# Patient Record
Sex: MALE | Race: BLACK OR AFRICAN AMERICAN | NOT HISPANIC OR LATINO | Employment: OTHER | URBAN - METROPOLITAN AREA
[De-identification: names, ages, dates, MRNs, and addresses within clinical notes are randomized per-mention and may not be internally consistent; named-entity substitution may affect disease eponyms.]

---

## 2018-05-30 ENCOUNTER — LAB VISIT (OUTPATIENT)
Dept: LAB | Facility: HOSPITAL | Age: 73
End: 2018-05-30
Attending: INTERNAL MEDICINE
Payer: MEDICARE

## 2018-05-30 DIAGNOSIS — I10 ESSENTIAL HYPERTENSION, MALIGNANT: ICD-10-CM

## 2018-05-30 DIAGNOSIS — Z79.899 NEED FOR PROPHYLACTIC CHEMOTHERAPY: ICD-10-CM

## 2018-05-30 DIAGNOSIS — D64.9 ANEMIA, UNSPECIFIED: Primary | ICD-10-CM

## 2018-05-30 PROCEDURE — 36415 COLL VENOUS BLD VENIPUNCTURE: CPT

## 2018-05-30 PROCEDURE — 80177 DRUG SCRN QUAN LEVETIRACETAM: CPT

## 2018-05-30 PROCEDURE — 80053 COMPREHEN METABOLIC PANEL: CPT

## 2018-05-30 PROCEDURE — 85027 COMPLETE CBC AUTOMATED: CPT

## 2018-06-01 LAB
ALBUMIN SERPL BCP-MCNC: 3.8 G/DL
ALP SERPL-CCNC: 74 U/L
ALT SERPL W/O P-5'-P-CCNC: 27 U/L
ANION GAP SERPL CALC-SCNC: 8 MMOL/L
AST SERPL-CCNC: 31 U/L
BILIRUB SERPL-MCNC: 0.6 MG/DL
BUN SERPL-MCNC: 16 MG/DL
CALCIUM SERPL-MCNC: 9.1 MG/DL
CHLORIDE SERPL-SCNC: 105 MMOL/L
CO2 SERPL-SCNC: 26 MMOL/L
CREAT SERPL-MCNC: 1.4 MG/DL
ERYTHROCYTE [DISTWIDTH] IN BLOOD BY AUTOMATED COUNT: 12.1 %
EST. GFR  (AFRICAN AMERICAN): 56.8 ML/MIN/1.73 M^2
EST. GFR  (NON AFRICAN AMERICAN): 49.1 ML/MIN/1.73 M^2
GLUCOSE SERPL-MCNC: 87 MG/DL
HCT VFR BLD AUTO: 38.8 %
HGB BLD-MCNC: 12.8 G/DL
MCH RBC QN AUTO: 29 PG
MCHC RBC AUTO-ENTMCNC: 33 G/DL
MCV RBC AUTO: 88 FL
PLATELET # BLD AUTO: 205 K/UL
PMV BLD AUTO: 10.3 FL
POTASSIUM SERPL-SCNC: 3.6 MMOL/L
PROT SERPL-MCNC: 7.3 G/DL
RBC # BLD AUTO: 4.42 M/UL
SODIUM SERPL-SCNC: 139 MMOL/L
WBC # BLD AUTO: 6.65 K/UL

## 2018-06-04 LAB — LEVETIRACETAM SERPL-MCNC: 11.3 UG/ML (ref 3–60)

## 2018-08-09 ENCOUNTER — LAB VISIT (OUTPATIENT)
Dept: LAB | Facility: HOSPITAL | Age: 73
End: 2018-08-09
Attending: INTERNAL MEDICINE
Payer: MEDICARE

## 2018-08-09 DIAGNOSIS — G40.109 SPECIAL SENSORY ATTACKS: ICD-10-CM

## 2018-08-09 DIAGNOSIS — F10.21 ACUTE ALCOHOLIC INTOXICATION IN ALCOHOLISM, IN REMISSION: ICD-10-CM

## 2018-08-09 DIAGNOSIS — Z86.73 PERSONAL HISTORY OF TRANSIENT CEREBRAL ISCHEMIA: Primary | ICD-10-CM

## 2018-08-09 LAB
ANION GAP SERPL CALC-SCNC: 8 MMOL/L
BUN SERPL-MCNC: 14 MG/DL
CALCIUM SERPL-MCNC: 9.2 MG/DL
CHLORIDE SERPL-SCNC: 109 MMOL/L
CO2 SERPL-SCNC: 26 MMOL/L
CREAT SERPL-MCNC: 1.3 MG/DL
ERYTHROCYTE [DISTWIDTH] IN BLOOD BY AUTOMATED COUNT: 12.5 %
EST. GFR  (AFRICAN AMERICAN): >60 ML/MIN/1.73 M^2
EST. GFR  (NON AFRICAN AMERICAN): 53.8 ML/MIN/1.73 M^2
GLUCOSE SERPL-MCNC: 133 MG/DL
HCT VFR BLD AUTO: 42.9 %
HGB BLD-MCNC: 13.9 G/DL
MCH RBC QN AUTO: 28.9 PG
MCHC RBC AUTO-ENTMCNC: 32.4 G/DL
MCV RBC AUTO: 89 FL
PLATELET # BLD AUTO: 210 K/UL
PMV BLD AUTO: 10.4 FL
POTASSIUM SERPL-SCNC: 3.6 MMOL/L
RBC # BLD AUTO: 4.81 M/UL
SODIUM SERPL-SCNC: 143 MMOL/L
WBC # BLD AUTO: 6.26 K/UL

## 2018-08-09 PROCEDURE — 80048 BASIC METABOLIC PNL TOTAL CA: CPT

## 2018-08-09 PROCEDURE — 85027 COMPLETE CBC AUTOMATED: CPT

## 2018-08-09 PROCEDURE — 80177 DRUG SCRN QUAN LEVETIRACETAM: CPT

## 2018-08-09 PROCEDURE — 36415 COLL VENOUS BLD VENIPUNCTURE: CPT

## 2018-08-13 LAB — LEVETIRACETAM SERPL-MCNC: 8.6 UG/ML (ref 3–60)

## 2018-11-21 ENCOUNTER — LAB VISIT (OUTPATIENT)
Dept: LAB | Facility: HOSPITAL | Age: 73
End: 2018-11-21
Attending: NURSE PRACTITIONER
Payer: MEDICARE

## 2018-11-21 DIAGNOSIS — N39.0 UTI (URINARY TRACT INFECTION): Primary | ICD-10-CM

## 2018-11-21 LAB
BILIRUB UR QL STRIP: NEGATIVE
CLARITY UR: CLEAR
COLOR UR: YELLOW
GLUCOSE UR QL STRIP: NEGATIVE
HGB UR QL STRIP: NEGATIVE
KETONES UR QL STRIP: NEGATIVE
LEUKOCYTE ESTERASE UR QL STRIP: NEGATIVE
NITRITE UR QL STRIP: NEGATIVE
PH UR STRIP: 6 [PH] (ref 5–8)
PROT UR QL STRIP: NEGATIVE
SP GR UR STRIP: 1.01 (ref 1–1.03)
URN SPEC COLLECT METH UR: NORMAL
UROBILINOGEN UR STRIP-ACNC: NEGATIVE EU/DL

## 2018-11-21 PROCEDURE — 81003 URINALYSIS AUTO W/O SCOPE: CPT

## 2018-11-21 PROCEDURE — 87086 URINE CULTURE/COLONY COUNT: CPT

## 2018-11-23 LAB — BACTERIA UR CULT: NORMAL

## 2018-11-29 ENCOUNTER — LAB VISIT (OUTPATIENT)
Dept: LAB | Facility: HOSPITAL | Age: 73
End: 2018-11-29
Attending: INTERNAL MEDICINE
Payer: MEDICARE

## 2018-11-29 DIAGNOSIS — Z86.73 PERSONAL HISTORY OF TRANSIENT CEREBRAL ISCHEMIA: ICD-10-CM

## 2018-11-29 DIAGNOSIS — N19 RENAL FAILURE SYNDROME: ICD-10-CM

## 2018-11-29 DIAGNOSIS — G40.109 SPECIAL SENSORY ATTACKS: Primary | ICD-10-CM

## 2018-11-29 DIAGNOSIS — I10 ESSENTIAL HYPERTENSION, MALIGNANT: ICD-10-CM

## 2018-11-29 LAB
ANION GAP SERPL CALC-SCNC: 10 MMOL/L
BASOPHILS # BLD AUTO: 0.04 K/UL
BASOPHILS NFR BLD: 0.6 %
BUN SERPL-MCNC: 19 MG/DL
CALCIUM SERPL-MCNC: 9.7 MG/DL
CHLORIDE SERPL-SCNC: 105 MMOL/L
CO2 SERPL-SCNC: 27 MMOL/L
CREAT SERPL-MCNC: 1.4 MG/DL
DIFFERENTIAL METHOD: NORMAL
EOSINOPHIL # BLD AUTO: 0.2 K/UL
EOSINOPHIL NFR BLD: 2.8 %
ERYTHROCYTE [DISTWIDTH] IN BLOOD BY AUTOMATED COUNT: 12.2 %
EST. GFR  (AFRICAN AMERICAN): 56.8 ML/MIN/1.73 M^2
EST. GFR  (NON AFRICAN AMERICAN): 49.1 ML/MIN/1.73 M^2
GLUCOSE SERPL-MCNC: 109 MG/DL
HCT VFR BLD AUTO: 43.9 %
HGB BLD-MCNC: 14.5 G/DL
IMM GRANULOCYTES # BLD AUTO: 0.03 K/UL
IMM GRANULOCYTES NFR BLD AUTO: 0.4 %
LYMPHOCYTES # BLD AUTO: 3 K/UL
LYMPHOCYTES NFR BLD: 43.8 %
MCH RBC QN AUTO: 28.8 PG
MCHC RBC AUTO-ENTMCNC: 33 G/DL
MCV RBC AUTO: 87 FL
MONOCYTES # BLD AUTO: 0.7 K/UL
MONOCYTES NFR BLD: 9.6 %
NEUTROPHILS # BLD AUTO: 3 K/UL
NEUTROPHILS NFR BLD: 42.8 %
NRBC BLD-RTO: 0 /100 WBC
PLATELET # BLD AUTO: 213 K/UL
PMV BLD AUTO: 10.4 FL
POTASSIUM SERPL-SCNC: 3.8 MMOL/L
RBC # BLD AUTO: 5.04 M/UL
SODIUM SERPL-SCNC: 142 MMOL/L
WBC # BLD AUTO: 6.9 K/UL

## 2018-11-29 PROCEDURE — 85025 COMPLETE CBC W/AUTO DIFF WBC: CPT

## 2018-11-29 PROCEDURE — 36415 COLL VENOUS BLD VENIPUNCTURE: CPT

## 2018-11-29 PROCEDURE — 80177 DRUG SCRN QUAN LEVETIRACETAM: CPT

## 2018-11-29 PROCEDURE — 80048 BASIC METABOLIC PNL TOTAL CA: CPT

## 2018-12-01 LAB — LEVETIRACETAM SERPL-MCNC: 12.6 UG/ML (ref 3–60)

## 2019-02-07 ENCOUNTER — LAB VISIT (OUTPATIENT)
Dept: LAB | Facility: HOSPITAL | Age: 74
End: 2019-02-07
Attending: INTERNAL MEDICINE
Payer: MEDICARE

## 2019-02-07 DIAGNOSIS — D64.9 ANEMIA, UNSPECIFIED: Primary | ICD-10-CM

## 2019-02-07 DIAGNOSIS — G40.109 SPECIAL SENSORY ATTACKS: ICD-10-CM

## 2019-02-07 DIAGNOSIS — R00.0 TACHYCARDIA, UNSPECIFIED: ICD-10-CM

## 2019-02-07 LAB
ANION GAP SERPL CALC-SCNC: 11 MMOL/L
BASOPHILS # BLD AUTO: 0.04 K/UL
BASOPHILS NFR BLD: 0.5 %
BUN SERPL-MCNC: 15 MG/DL
CALCIUM SERPL-MCNC: 9.2 MG/DL
CHLORIDE SERPL-SCNC: 105 MMOL/L
CO2 SERPL-SCNC: 23 MMOL/L
CREAT SERPL-MCNC: 1.4 MG/DL
DIFFERENTIAL METHOD: ABNORMAL
EOSINOPHIL # BLD AUTO: 0.2 K/UL
EOSINOPHIL NFR BLD: 2.6 %
ERYTHROCYTE [DISTWIDTH] IN BLOOD BY AUTOMATED COUNT: 12.2 %
EST. GFR  (AFRICAN AMERICAN): 56.4 ML/MIN/1.73 M^2
EST. GFR  (NON AFRICAN AMERICAN): 48.8 ML/MIN/1.73 M^2
GLUCOSE SERPL-MCNC: 118 MG/DL
HCT VFR BLD AUTO: 43.1 %
HGB BLD-MCNC: 13.9 G/DL
IMM GRANULOCYTES # BLD AUTO: 0.06 K/UL
IMM GRANULOCYTES NFR BLD AUTO: 0.7 %
LYMPHOCYTES # BLD AUTO: 3.9 K/UL
LYMPHOCYTES NFR BLD: 46.6 %
MCH RBC QN AUTO: 28.4 PG
MCHC RBC AUTO-ENTMCNC: 32.3 G/DL
MCV RBC AUTO: 88 FL
MONOCYTES # BLD AUTO: 0.7 K/UL
MONOCYTES NFR BLD: 8.4 %
NEUTROPHILS # BLD AUTO: 3.4 K/UL
NEUTROPHILS NFR BLD: 41.2 %
NRBC BLD-RTO: 0 /100 WBC
PLATELET # BLD AUTO: 221 K/UL
PMV BLD AUTO: 10.5 FL
POTASSIUM SERPL-SCNC: 3.5 MMOL/L
RBC # BLD AUTO: 4.9 M/UL
SODIUM SERPL-SCNC: 139 MMOL/L
WBC # BLD AUTO: 8.32 K/UL

## 2019-02-07 PROCEDURE — 80048 BASIC METABOLIC PNL TOTAL CA: CPT

## 2019-02-07 PROCEDURE — 36415 COLL VENOUS BLD VENIPUNCTURE: CPT

## 2019-02-07 PROCEDURE — 80177 DRUG SCRN QUAN LEVETIRACETAM: CPT

## 2019-02-07 PROCEDURE — 85025 COMPLETE CBC W/AUTO DIFF WBC: CPT

## 2019-02-11 LAB — LEVETIRACETAM SERPL-MCNC: 9.9 UG/ML (ref 3–60)

## 2019-05-16 ENCOUNTER — LAB VISIT (OUTPATIENT)
Dept: LAB | Facility: HOSPITAL | Age: 74
End: 2019-05-16
Attending: INTERNAL MEDICINE
Payer: MEDICARE

## 2019-05-16 DIAGNOSIS — G40.109 SPECIAL SENSORY ATTACKS: Primary | ICD-10-CM

## 2019-05-16 DIAGNOSIS — D64.9 ANEMIA, UNSPECIFIED: ICD-10-CM

## 2019-05-16 DIAGNOSIS — R00.0 TACHYCARDIA, UNSPECIFIED: ICD-10-CM

## 2019-05-16 LAB
ALBUMIN SERPL BCP-MCNC: 3.9 G/DL (ref 3.5–5.2)
ALP SERPL-CCNC: 75 U/L (ref 55–135)
ALT SERPL W/O P-5'-P-CCNC: 21 U/L (ref 10–44)
ANION GAP SERPL CALC-SCNC: 9 MMOL/L (ref 8–16)
AST SERPL-CCNC: 25 U/L (ref 10–40)
BILIRUB SERPL-MCNC: 0.7 MG/DL (ref 0.1–1)
BUN SERPL-MCNC: 15 MG/DL (ref 8–23)
CALCIUM SERPL-MCNC: 8.8 MG/DL (ref 8.7–10.5)
CHLORIDE SERPL-SCNC: 108 MMOL/L (ref 95–110)
CO2 SERPL-SCNC: 23 MMOL/L (ref 23–29)
CREAT SERPL-MCNC: 1.3 MG/DL (ref 0.5–1.4)
ERYTHROCYTE [DISTWIDTH] IN BLOOD BY AUTOMATED COUNT: 12.3 % (ref 11.5–14.5)
EST. GFR  (AFRICAN AMERICAN): >60 ML/MIN/1.73 M^2
EST. GFR  (NON AFRICAN AMERICAN): 53.4 ML/MIN/1.73 M^2
GLUCOSE SERPL-MCNC: 109 MG/DL (ref 70–110)
HCT VFR BLD AUTO: 42.7 % (ref 40–54)
HGB BLD-MCNC: 13.8 G/DL (ref 14–18)
MCH RBC QN AUTO: 29.2 PG (ref 27–31)
MCHC RBC AUTO-ENTMCNC: 32.3 G/DL (ref 32–36)
MCV RBC AUTO: 90 FL (ref 82–98)
PLATELET # BLD AUTO: 220 K/UL (ref 150–350)
PMV BLD AUTO: 10.3 FL (ref 9.2–12.9)
POTASSIUM SERPL-SCNC: 3.7 MMOL/L (ref 3.5–5.1)
PROT SERPL-MCNC: 7.3 G/DL (ref 6–8.4)
RBC # BLD AUTO: 4.73 M/UL (ref 4.6–6.2)
SODIUM SERPL-SCNC: 140 MMOL/L (ref 136–145)
WBC # BLD AUTO: 8.26 K/UL (ref 3.9–12.7)

## 2019-05-16 PROCEDURE — 36415 COLL VENOUS BLD VENIPUNCTURE: CPT

## 2019-05-16 PROCEDURE — 80053 COMPREHEN METABOLIC PANEL: CPT

## 2019-05-16 PROCEDURE — 80177 DRUG SCRN QUAN LEVETIRACETAM: CPT

## 2019-05-16 PROCEDURE — 85027 COMPLETE CBC AUTOMATED: CPT

## 2019-05-20 LAB — LEVETIRACETAM SERPL-MCNC: 12.1 UG/ML (ref 3–60)

## 2019-08-22 ENCOUNTER — LAB VISIT (OUTPATIENT)
Dept: LAB | Facility: HOSPITAL | Age: 74
End: 2019-08-22
Attending: INTERNAL MEDICINE
Payer: MEDICARE

## 2019-08-22 DIAGNOSIS — G40.109 SPECIAL SENSORY ATTACKS: Primary | ICD-10-CM

## 2019-08-22 DIAGNOSIS — I10 ESSENTIAL HYPERTENSION, MALIGNANT: ICD-10-CM

## 2019-08-22 LAB
ANION GAP SERPL CALC-SCNC: 7 MMOL/L (ref 8–16)
BUN SERPL-MCNC: 12 MG/DL (ref 8–23)
CALCIUM SERPL-MCNC: 9 MG/DL (ref 8.7–10.5)
CHLORIDE SERPL-SCNC: 107 MMOL/L (ref 95–110)
CO2 SERPL-SCNC: 25 MMOL/L (ref 23–29)
CREAT SERPL-MCNC: 1.4 MG/DL (ref 0.5–1.4)
ERYTHROCYTE [DISTWIDTH] IN BLOOD BY AUTOMATED COUNT: 12.4 % (ref 11.5–14.5)
EST. GFR  (AFRICAN AMERICAN): 56.4 ML/MIN/1.73 M^2
EST. GFR  (NON AFRICAN AMERICAN): 48.8 ML/MIN/1.73 M^2
GLUCOSE SERPL-MCNC: 83 MG/DL (ref 70–110)
HCT VFR BLD AUTO: 41 % (ref 40–54)
HGB BLD-MCNC: 13.3 G/DL (ref 14–18)
MCH RBC QN AUTO: 28.9 PG (ref 27–31)
MCHC RBC AUTO-ENTMCNC: 32.4 G/DL (ref 32–36)
MCV RBC AUTO: 89 FL (ref 82–98)
PLATELET # BLD AUTO: 209 K/UL (ref 150–350)
PMV BLD AUTO: 10.2 FL (ref 9.2–12.9)
POTASSIUM SERPL-SCNC: 3.8 MMOL/L (ref 3.5–5.1)
RBC # BLD AUTO: 4.6 M/UL (ref 4.6–6.2)
SODIUM SERPL-SCNC: 139 MMOL/L (ref 136–145)
WBC # BLD AUTO: 6.54 K/UL (ref 3.9–12.7)

## 2019-08-22 PROCEDURE — 36415 COLL VENOUS BLD VENIPUNCTURE: CPT

## 2019-08-22 PROCEDURE — 80048 BASIC METABOLIC PNL TOTAL CA: CPT

## 2019-08-22 PROCEDURE — 80177 DRUG SCRN QUAN LEVETIRACETAM: CPT

## 2019-08-22 PROCEDURE — 85027 COMPLETE CBC AUTOMATED: CPT

## 2019-08-26 LAB — LEVETIRACETAM SERPL-MCNC: 13.3 UG/ML (ref 3–60)

## 2019-11-14 ENCOUNTER — HOSPITAL ENCOUNTER (INPATIENT)
Facility: HOSPITAL | Age: 74
LOS: 2 days | Discharge: NURSING, REHAB OR LONG-TERM CARE | DRG: 101 | End: 2019-11-16
Attending: INTERNAL MEDICINE | Admitting: INTERNAL MEDICINE
Payer: MEDICARE

## 2019-11-14 DIAGNOSIS — R56.9 SEIZURE: Primary | ICD-10-CM

## 2019-11-14 DIAGNOSIS — R41.82 AMS (ALTERED MENTAL STATUS): ICD-10-CM

## 2019-11-14 DIAGNOSIS — G40.909 RECURRENT SEIZURES: ICD-10-CM

## 2019-11-14 PROBLEM — G40.901 STATUS EPILEPTICUS: Status: ACTIVE | Noted: 2019-11-14

## 2019-11-14 LAB
ALLENS TEST: ABNORMAL
ANION GAP SERPL CALC-SCNC: 30 MMOL/L (ref 8–16)
APTT BLDCRRT: 22.1 SEC (ref 21–32)
BASOPHILS # BLD AUTO: ABNORMAL K/UL (ref 0–0.2)
BASOPHILS NFR BLD: 1 % (ref 0–1.9)
BUN SERPL-MCNC: 16 MG/DL (ref 8–23)
CALCIUM SERPL-MCNC: 9.4 MG/DL (ref 8.7–10.5)
CHLORIDE SERPL-SCNC: 107 MMOL/L (ref 95–110)
CK SERPL-CCNC: 482 U/L (ref 20–200)
CO2 SERPL-SCNC: 8 MMOL/L (ref 23–29)
CREAT SERPL-MCNC: 1.9 MG/DL (ref 0.5–1.4)
DELSYS: ABNORMAL
DIFFERENTIAL METHOD: ABNORMAL
EOSINOPHIL # BLD AUTO: ABNORMAL K/UL (ref 0–0.5)
EOSINOPHIL NFR BLD: 3 % (ref 0–8)
ERYTHROCYTE [DISTWIDTH] IN BLOOD BY AUTOMATED COUNT: 12.2 % (ref 11.5–14.5)
EST. GFR  (AFRICAN AMERICAN): 39 ML/MIN/1.73 M^2
EST. GFR  (NON AFRICAN AMERICAN): 33.7 ML/MIN/1.73 M^2
FIO2: 60
GLUCOSE SERPL-MCNC: 190 MG/DL (ref 70–110)
HCO3 UR-SCNC: 17.4 MMOL/L (ref 24–28)
HCT VFR BLD AUTO: 46.1 % (ref 40–54)
HGB BLD-MCNC: 14 G/DL (ref 14–18)
IMM GRANULOCYTES # BLD AUTO: ABNORMAL K/UL (ref 0–0.04)
IMM GRANULOCYTES NFR BLD AUTO: ABNORMAL % (ref 0–0.5)
LYMPHOCYTES # BLD AUTO: ABNORMAL K/UL (ref 1–4.8)
LYMPHOCYTES NFR BLD: 47 % (ref 18–48)
MAGNESIUM SERPL-MCNC: 2.6 MG/DL (ref 1.6–2.6)
MCH RBC QN AUTO: 29.1 PG (ref 27–31)
MCHC RBC AUTO-ENTMCNC: 30.4 G/DL (ref 32–36)
MCV RBC AUTO: 96 FL (ref 82–98)
MODE: ABNORMAL
MONOCYTES # BLD AUTO: ABNORMAL K/UL (ref 0.3–1)
MONOCYTES NFR BLD: 2 % (ref 4–15)
NEUTROPHILS # BLD AUTO: ABNORMAL K/UL (ref 1.8–7.7)
NEUTROPHILS NFR BLD: 43 % (ref 38–73)
NEUTS BAND NFR BLD MANUAL: 4 %
NRBC BLD-RTO: 0 /100 WBC
PCO2 BLDA: 40.8 MMHG (ref 35–45)
PH SMN: 7.24 [PH] (ref 7.35–7.45)
PLATELET # BLD AUTO: 287 K/UL (ref 150–350)
PMV BLD AUTO: 10.3 FL (ref 9.2–12.9)
PO2 BLDA: 220 MMHG (ref 80–100)
POC BE: -10 MMOL/L
POC SATURATED O2: 100 % (ref 95–100)
POC TCO2: 19 MMOL/L (ref 23–27)
POTASSIUM SERPL-SCNC: 3.7 MMOL/L (ref 3.5–5.1)
RBC # BLD AUTO: 4.81 M/UL (ref 4.6–6.2)
SAMPLE: ABNORMAL
SITE: ABNORMAL
SODIUM SERPL-SCNC: 145 MMOL/L (ref 136–145)
WBC # BLD AUTO: 25.25 K/UL (ref 3.9–12.7)

## 2019-11-14 PROCEDURE — 70450 CT HEAD WITHOUT CONTRAST: ICD-10-PCS | Mod: 26,,, | Performed by: RADIOLOGY

## 2019-11-14 PROCEDURE — 20000000 HC ICU ROOM

## 2019-11-14 PROCEDURE — 82803 BLOOD GASES ANY COMBINATION: CPT

## 2019-11-14 PROCEDURE — 71045 XR CHEST AP PORTABLE: ICD-10-PCS | Mod: 26,,, | Performed by: RADIOLOGY

## 2019-11-14 PROCEDURE — 82550 ASSAY OF CK (CPK): CPT

## 2019-11-14 PROCEDURE — 36415 COLL VENOUS BLD VENIPUNCTURE: CPT

## 2019-11-14 PROCEDURE — 85007 BL SMEAR W/DIFF WBC COUNT: CPT

## 2019-11-14 PROCEDURE — 70450 CT HEAD/BRAIN W/O DYE: CPT | Mod: 26,,, | Performed by: RADIOLOGY

## 2019-11-14 PROCEDURE — 85027 COMPLETE CBC AUTOMATED: CPT

## 2019-11-14 PROCEDURE — 80177 DRUG SCRN QUAN LEVETIRACETAM: CPT

## 2019-11-14 PROCEDURE — 63600175 PHARM REV CODE 636 W HCPCS: Performed by: INTERNAL MEDICINE

## 2019-11-14 PROCEDURE — 80048 BASIC METABOLIC PNL TOTAL CA: CPT

## 2019-11-14 PROCEDURE — 36600 WITHDRAWAL OF ARTERIAL BLOOD: CPT

## 2019-11-14 PROCEDURE — 83735 ASSAY OF MAGNESIUM: CPT

## 2019-11-14 PROCEDURE — 94760 N-INVAS EAR/PLS OXIMETRY 1: CPT

## 2019-11-14 PROCEDURE — 70450 CT HEAD/BRAIN W/O DYE: CPT | Mod: TC

## 2019-11-14 PROCEDURE — 71045 X-RAY EXAM CHEST 1 VIEW: CPT | Mod: 26,,, | Performed by: RADIOLOGY

## 2019-11-14 PROCEDURE — 99285 EMERGENCY DEPT VISIT HI MDM: CPT | Mod: 25

## 2019-11-14 PROCEDURE — 96365 THER/PROPH/DIAG IV INF INIT: CPT

## 2019-11-14 PROCEDURE — 85730 THROMBOPLASTIN TIME PARTIAL: CPT

## 2019-11-14 PROCEDURE — 71045 X-RAY EXAM CHEST 1 VIEW: CPT | Mod: TC,FY

## 2019-11-14 PROCEDURE — 25000003 PHARM REV CODE 250: Performed by: INTERNAL MEDICINE

## 2019-11-14 PROCEDURE — S0028 INJECTION, FAMOTIDINE, 20 MG: HCPCS | Performed by: INTERNAL MEDICINE

## 2019-11-14 PROCEDURE — 27000221 HC OXYGEN, UP TO 24 HOURS

## 2019-11-14 PROCEDURE — 99900035 HC TECH TIME PER 15 MIN (STAT)

## 2019-11-14 RX ORDER — AMOXICILLIN 250 MG
1 CAPSULE ORAL 2 TIMES DAILY
Status: DISCONTINUED | OUTPATIENT
Start: 2019-11-14 | End: 2019-11-16 | Stop reason: HOSPADM

## 2019-11-14 RX ORDER — FOLIC ACID 1 MG/1
1 TABLET ORAL DAILY
Status: DISCONTINUED | OUTPATIENT
Start: 2019-11-14 | End: 2019-11-16 | Stop reason: HOSPADM

## 2019-11-14 RX ORDER — SODIUM CHLORIDE 0.9 % (FLUSH) 0.9 %
10 SYRINGE (ML) INJECTION
Status: DISCONTINUED | OUTPATIENT
Start: 2019-11-14 | End: 2019-11-16 | Stop reason: HOSPADM

## 2019-11-14 RX ORDER — OLANZAPINE 5 MG/1
5 TABLET, ORALLY DISINTEGRATING ORAL DAILY
Status: DISCONTINUED | OUTPATIENT
Start: 2019-11-14 | End: 2019-11-16 | Stop reason: HOSPADM

## 2019-11-14 RX ORDER — IRON POLYSACCHARIDE COMPLEX 150 MG
150 CAPSULE ORAL DAILY
Status: DISCONTINUED | OUTPATIENT
Start: 2019-11-14 | End: 2019-11-16 | Stop reason: HOSPADM

## 2019-11-14 RX ORDER — METOPROLOL TARTRATE 25 MG/1
25 TABLET, FILM COATED ORAL 2 TIMES DAILY
Status: DISCONTINUED | OUTPATIENT
Start: 2019-11-14 | End: 2019-11-16 | Stop reason: HOSPADM

## 2019-11-14 RX ORDER — FOLIC ACID 1 MG/1
1 TABLET ORAL DAILY
COMMUNITY

## 2019-11-14 RX ORDER — ONDANSETRON 2 MG/ML
4 INJECTION INTRAMUSCULAR; INTRAVENOUS EVERY 8 HOURS PRN
Status: DISCONTINUED | OUTPATIENT
Start: 2019-11-14 | End: 2019-11-16 | Stop reason: HOSPADM

## 2019-11-14 RX ORDER — AMLODIPINE BESYLATE 2.5 MG/1
5 TABLET ORAL DAILY
Status: DISCONTINUED | OUTPATIENT
Start: 2019-11-14 | End: 2019-11-16 | Stop reason: HOSPADM

## 2019-11-14 RX ORDER — FAMOTIDINE 10 MG/ML
20 INJECTION INTRAVENOUS EVERY 12 HOURS
Status: DISCONTINUED | OUTPATIENT
Start: 2019-11-14 | End: 2019-11-16 | Stop reason: HOSPADM

## 2019-11-14 RX ORDER — AMOXICILLIN 250 MG
1 CAPSULE ORAL DAILY
Status: DISCONTINUED | OUTPATIENT
Start: 2019-11-14 | End: 2019-11-16 | Stop reason: HOSPADM

## 2019-11-14 RX ORDER — THIAMINE HCL 100 MG
100 TABLET ORAL DAILY
Status: DISCONTINUED | OUTPATIENT
Start: 2019-11-14 | End: 2019-11-16 | Stop reason: HOSPADM

## 2019-11-14 RX ORDER — LEVETIRACETAM 250 MG/1
500 TABLET ORAL 2 TIMES DAILY
Status: DISCONTINUED | OUTPATIENT
Start: 2019-11-14 | End: 2019-11-16 | Stop reason: HOSPADM

## 2019-11-14 RX ORDER — ACETAMINOPHEN 325 MG/1
650 TABLET ORAL EVERY 6 HOURS PRN
Status: DISCONTINUED | OUTPATIENT
Start: 2019-11-14 | End: 2019-11-16 | Stop reason: HOSPADM

## 2019-11-14 RX ORDER — OLANZAPINE 5 MG/1
10 TABLET, ORALLY DISINTEGRATING ORAL NIGHTLY
Status: DISCONTINUED | OUTPATIENT
Start: 2019-11-14 | End: 2019-11-16 | Stop reason: HOSPADM

## 2019-11-14 RX ORDER — ADHESIVE BANDAGE
20 BANDAGE TOPICAL DAILY PRN
Status: DISCONTINUED | OUTPATIENT
Start: 2019-11-14 | End: 2019-11-16 | Stop reason: HOSPADM

## 2019-11-14 RX ORDER — LACOSAMIDE 200 MG/1
200 TABLET ORAL 2 TIMES DAILY
Status: DISCONTINUED | OUTPATIENT
Start: 2019-11-14 | End: 2019-11-16 | Stop reason: HOSPADM

## 2019-11-14 RX ORDER — MAG HYDROX/ALUMINUM HYD/SIMETH 200-200-20
30 SUSPENSION, ORAL (FINAL DOSE FORM) ORAL
Status: DISCONTINUED | OUTPATIENT
Start: 2019-11-14 | End: 2019-11-16 | Stop reason: HOSPADM

## 2019-11-14 RX ADMIN — SODIUM CHLORIDE 1000 ML: 0.9 INJECTION, SOLUTION INTRAVENOUS at 10:11

## 2019-11-14 RX ADMIN — DEXTROSE MONOHYDRATE 1500 MG: 50 INJECTION, SOLUTION INTRAVENOUS at 11:11

## 2019-11-14 RX ADMIN — SENNOSIDES AND DOCUSATE SODIUM 1 TABLET: 8.6; 5 TABLET ORAL at 09:11

## 2019-11-14 RX ADMIN — OLANZAPINE 10 MG: 5 TABLET, ORALLY DISINTEGRATING ORAL at 09:11

## 2019-11-14 RX ADMIN — FAMOTIDINE 20 MG: 10 INJECTION INTRAVENOUS at 09:11

## 2019-11-14 RX ADMIN — METOPROLOL TARTRATE 25 MG: 25 TABLET ORAL at 09:11

## 2019-11-14 NOTE — PLAN OF CARE
PLAN OF CARE DISCUSSED VIA PHONE WITH PT'S DAUGHTER, HECTOR LAMBERT, WHO LIVES IN [place]. QUESTIONS ENCOURAGED AND ANSWERED. VOICED UNDERSTANDING.

## 2019-11-14 NOTE — PLAN OF CARE
11/14/19 1629   Discharge Assessment   Assessment Type Discharge Planning Assessment   Confirmed/corrected address and phone number on facesheet? Yes   Assessment information obtained from?   ([facility] ADMISSIONS  DIRECTOR)   Prior to hospitilization cognitive status: Not Oriented to Person;Not Oriented to Time   Prior to hospitalization functional status: Wheelchair Bound   Current cognitive status: Coma/Sedated/Intubated   Current Functional Status: Completely Dependent   Facility Arrived From: [facility]   Lives With facility resident   Able to Return to Prior Arrangements other (see comments)  (UNABLE TO DETERMINE AT THIS TIME.)   Is patient able to care for self after discharge? Unable to determine at this time (comments)   Who are your caregiver(s) and their phone number(s)? [facility] STAFF [phone]   Patient's perception of discharge disposition other (comments)  (PT UNRESPONSIVE)   Readmission Within the Last 30 Days no previous admission in last 30 days   Patient currently being followed by outpatient case management? No   Patient currently receives any other outside agency services? No   Equipment Currently Used at Home other (see comments)  (ANY DME PT HAS NEEDED HAS BEEN PROVIDED BY [facility])   Do you have any problems affording any of your prescribed medications? No   Is the patient taking medications as prescribed? yes   Dialysis Name and Scheduled days N/A   Does the patient receive services at the [facility]? 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 DR BISHOP IS PT'S PCP. PT HAS A DAUGHTER IN [place] WITH A [facility] PHONE PREFIXI, HECTOR LAMBERT, [phone] WHO HAS CALLED ABOUT PT AND GIVEN CONSENT FOR HIS IMM TO BE SIGNED BY  IN PT'S BEHALF. [facility] SAYS PT IS WHEELCHAIR BOUND AND HAS A SEIZURE DISORDER AND DEMENTIA. HE HAD EXTENDED  SEIZURE ACTIVITY PRIOR TO ADMISSION HERE. SW WILL FOLLOW AND ASSIST AS NEEDED.

## 2019-11-14 NOTE — ED PROVIDER NOTES
Encounter Date: 11/14/2019       History     Chief Complaint   Patient presents with    Seizures     Patient is brought from the nursing home with status epilepticus.  The word is that he had seizures for approximately 20 min at the nursing home.  He has a seizure disorder and they were evidently tried to let the seizure run its course.  When he continued to seize [facility] was called.  Took them approximately 20 min to get to the nursing home and then in her 20-30 minutes to get here.  In all the patient had probably 90 min of seizure activity.  On arrival he was seizing and had Ativan.  He abruptly quit seizing and was observed closely.  Intubation was not necessary.    Patient has a history of seizure disorder on 2 medications does have been given according to the nursing home regularly.    Patient also has a history of stroke and dementia.  According the records he is a full code.    On arrival patient had still has some minor seizure activity his eyes were deviated to the left.  He had no spontaneous movement no response to pain. Same after he had Ativan.        Review of patient's allergies indicates:   Allergen Reactions    Ace inhibitors     Bactrim [sulfamethoxazole-trimethoprim] Rash     Past Medical History:   Diagnosis Date    CVA (cerebral infarction)     Hypertension     Non-compliance     Seizure disorder      History reviewed. No pertinent surgical history.  History reviewed. No pertinent family history.  Social History     Tobacco Use    Smoking status: Current Every Day Smoker     Packs/day: 0.50     Types: Cigarettes   Substance Use Topics    Alcohol use: Yes     Alcohol/week: 4.2 standard drinks     Types: 5 drink(s) per week    Drug use: No     Review of Systems   Constitutional: Negative for fever.   HENT: Negative for sore throat.    Respiratory: Negative for shortness of breath.    Cardiovascular: Negative for chest pain.   Gastrointestinal: Negative for nausea.   Genitourinary: Negative  for dysuria.   Musculoskeletal: Negative for back pain.   Skin: Negative for rash.   Neurological: Positive for seizures. Negative for weakness.   Hematological: Does not bruise/bleed easily.       Physical Exam     Initial Vitals [11/14/19 0927]   BP Pulse Resp Temp SpO2   114/64 100 (!) 36 98.2 °F (36.8 °C) 100 %      MAP       --         Physical Exam    Nursing note and vitals reviewed.  Constitutional: Vital signs are normal. He appears well-developed and well-nourished. He is active and cooperative.   HENT:   Head: Normocephalic and atraumatic.   Right Ear: External ear normal.   Left Ear: External ear normal.   Nose: Nose normal.   Mouth/Throat: Oropharynx is clear and moist.   Eyes: Conjunctivae and lids are normal. Pupils are equal, round, and reactive to light. Lids are everted and swept, no foreign bodies found.   Neck: Trachea normal, normal range of motion and full passive range of motion without pain. Neck supple.   Cardiovascular: Normal rate, regular rhythm, S1 normal, S2 normal, normal heart sounds, intact distal pulses and normal pulses.  No extrasystoles are present.    Abdominal: Soft. Normal appearance and bowel sounds are normal.   Neurological: GCS eye subscore is 4. GCS verbal subscore is 5. GCS motor subscore is 6.   Patient with tonic clonic seizures eyes deviated to the right.   Skin: Skin is warm, dry and intact. Capillary refill takes less than 2 seconds.   Psychiatric: His speech is normal. Cognition and memory are normal.         ED Course   Procedures  Labs Reviewed   CBC W/ AUTO DIFFERENTIAL - Abnormal; Notable for the following components:       Result Value    WBC 25.25 (*)     Mean Corpuscular Hemoglobin Conc 30.4 (*)     Mono% 2.0 (*)     All other components within normal limits   BASIC METABOLIC PANEL - Abnormal; Notable for the following components:    CO2 8 (*)     Glucose 190 (*)     Creatinine 1.9 (*)     Anion Gap 30 (*)     eGFR if  39.0 (*)     eGFR if  non  33.7 (*)     All other components within normal limits    Narrative:     CO2   critical result(s) called and verbal readback obtained from   TITI Ramos by DONNA 11/14/2019 10:43  Recoll. [id number] by DONNA at 11/14/2019 09:56, reason: HEMOLYZED   ISTAT PROCEDURE - Abnormal; Notable for the following components:    POC PH 7.237 (*)     POC PO2 220 (*)     POC HCO3 17.4 (*)     POC TCO2 19 (*)     All other components within normal limits   MAGNESIUM    Narrative:     Recoll. [id number] by DONNA at 11/14/2019 09:56, reason: HEMOLYZED   APTT   LEVETIRACETAM  (KEPPRA) LEVEL   CK   LEVETIRACETAM  (KEPPRA) LEVEL     EKG Readings: (Independently Interpreted)   Initial Reading: No STEMI. Rhythm: Normal Sinus Rhythm. Conduction: RBBB. ST Segments: Normal ST Segments. T Waves: Normal. Axis: Left Axis Deviation. Clinical Impression: Normal Sinus Rhythm with RBBB       Imaging Results          CT Head Without Contrast (Final result)  Result time 11/14/19 11:31:01    Final result by Berto Salazar MD (11/14/19 11:31:01)                 Impression:      1. Cortical atrophy with periventricular deep white matter change consistent with chronic small vessel ischemic disease.  2. Small remote lacunar infarcts of the bilateral basal ganglia.  3. Chronic small focus of left cerebellar encephalomalacia.      Electronically signed by: Berto Salazar  Date:    11/14/2019  Time:    11:31             Narrative:    EXAMINATION:  CT HEAD WITHOUT CONTRAST    CLINICAL HISTORY:  Seizures new or progressive;    TECHNIQUE:  Low dose axial images were obtained through the head.  Coronal and sagittal reformations were also performed. Contrast was not administered.    COMPARISON:  MRI 04/29/2016.  CT 04/29/2016.    FINDINGS:  There is no acute hemorrhage or infarction.  There is cortical atrophy.  There are periventricular deep white matter changes consistent with chronic small vessel ischemic disease.  Small remote lacunar  infarcts of the bilateral basal ganglia.  Chronic small focus of left cerebellar encephalomalacia from remote infarction.    No extra-axial fluid collections.  Ventricles are normal in size, shape and configuration.  The basal cisterns are patent.    The imaged paranasal sinuses and ethmoid air cells are well aerated.    The mastoid air cells and middle ears are normally pneumatized.                               X-Ray Chest AP Portable (Final result)  Result time 11/14/19 11:12:43    Final result by Berto Salazar MD (11/14/19 11:12:43)                 Impression:      1. Mild pulmonary hypoinflation.  2. Findings suspicious for mild congestive heart failure.      Electronically signed by: Berto Salazar  Date:    11/14/2019  Time:    11:12             Narrative:    EXAMINATION:  XR CHEST AP PORTABLE    CLINICAL HISTORY:  Altered mental status, unspecified    TECHNIQUE:  Single frontal view of the chest was performed.    COMPARISON:  04/30/2016    FINDINGS:  Mild pulmonary hypoinflation.  Mild diffuse prominence of the pulmonary interstitium most predominant within the perihilar lungs suspicious for mild interstitial edema.  No significant pleural effusion.    Heart size is mildly enlarged.  Calcified aortic plaque.  Trachea midline.  Mediastinal contours unremarkable.    Bony thorax intact.                              X-Rays:   Independently Interpreted Readings:   Chest X-Ray: Normal heart size.  No infiltrates.  No acute abnormalities.     Medical Decision Making:   Clinical Tests:   Lab Tests: Ordered and Reviewed  The following lab test(s) were unremarkable: CBC and CMP       <> Summary of Lab: White count was 25,000.  This probably from the seizure activity.  His creatinine slightly elevated 1.9.  This is new compared is all studies.  Otherwise labs are relatively unremarkable.  Radiological Study: Ordered and Reviewed  Medical Tests: Ordered and Reviewed  ED Management:  CT of the brain showed no acute  findings.  He has marked atrophy and and ventricular dilatation.  Chest x-ray was unremarkable.    Patient was given Ativan.  He he quit having seizures and was observed for several hours.  He had ABGs which showed good oxygenation but metabolic acidosis again from the seizures.    Patient had no further seizure activity was stable he had a mild episode of hypertension which corrected with fluid replacement.  The hospitalist was consulted patient is to be observed, have Keppra levels, be given additional anti epileptic medication observed complications.                                 Clinical Impression:       ICD-10-CM ICD-9-CM   1. Seizure R56.9 780.39   2. AMS (altered mental status) R41.82 780.97   3. Recurrent seizures G40.909 345.80         Disposition:   Disposition: Discharged  Condition: Stable                     Blake Breaux MD  11/14/19 9345

## 2019-11-14 NOTE — NURSING
Pt received from ER via stretcher. Unable to obtain medical surgical history from patient, as he is currently postictal. Pt placed on bedside monitor. Will monitor for seizure activity. Bed alarm in use.

## 2019-11-14 NOTE — PLAN OF CARE
11/14/19 1626   Medicare Message   Important Message from Medicare regarding Discharge Appeal Rights Given to patient/caregiver;Explained to patient/caregiver;Signed/date by patient/caregiver   Date IMM was signed 11/14/19   Time IMM was signed 9018   PT'S DAUGHTER, HECTOR LAMBERT CALLED ABOUT PT AND GAVE VERBA CONSENT OVER PHONE FOR SW TO SIGN IMM FOR PT; WITNESSED BY MARCELO FORBES RN

## 2019-11-15 PROBLEM — R56.9 SEIZURE: Status: ACTIVE | Noted: 2019-11-15

## 2019-11-15 LAB
ALLENS TEST: ABNORMAL
ANION GAP SERPL CALC-SCNC: 8 MMOL/L (ref 8–16)
BUN SERPL-MCNC: 20 MG/DL (ref 8–23)
CALCIUM SERPL-MCNC: 9.3 MG/DL (ref 8.7–10.5)
CHLORIDE SERPL-SCNC: 111 MMOL/L (ref 95–110)
CO2 SERPL-SCNC: 24 MMOL/L (ref 23–29)
CREAT SERPL-MCNC: 1.5 MG/DL (ref 0.5–1.4)
DELSYS: ABNORMAL
EST. GFR  (AFRICAN AMERICAN): 51.9 ML/MIN/1.73 M^2
EST. GFR  (NON AFRICAN AMERICAN): 44.9 ML/MIN/1.73 M^2
GLUCOSE SERPL-MCNC: 99 MG/DL (ref 70–110)
HCO3 UR-SCNC: 23.9 MMOL/L (ref 24–28)
PCO2 BLDA: 41.9 MMHG (ref 35–45)
PH SMN: 7.36 [PH] (ref 7.35–7.45)
PO2 BLDA: 95 MMHG (ref 80–100)
POC BE: -2 MMOL/L
POC SATURATED O2: 97 % (ref 95–100)
POC TCO2: 25 MMOL/L (ref 23–27)
POTASSIUM SERPL-SCNC: 4.1 MMOL/L (ref 3.5–5.1)
SAMPLE: ABNORMAL
SITE: ABNORMAL
SODIUM SERPL-SCNC: 143 MMOL/L (ref 136–145)

## 2019-11-15 PROCEDURE — 27000221 HC OXYGEN, UP TO 24 HOURS

## 2019-11-15 PROCEDURE — 11000001 HC ACUTE MED/SURG PRIVATE ROOM

## 2019-11-15 PROCEDURE — 80048 BASIC METABOLIC PNL TOTAL CA: CPT

## 2019-11-15 PROCEDURE — 82803 BLOOD GASES ANY COMBINATION: CPT

## 2019-11-15 PROCEDURE — 94761 N-INVAS EAR/PLS OXIMETRY MLT: CPT

## 2019-11-15 PROCEDURE — 25000003 PHARM REV CODE 250: Performed by: INTERNAL MEDICINE

## 2019-11-15 PROCEDURE — S0028 INJECTION, FAMOTIDINE, 20 MG: HCPCS | Performed by: INTERNAL MEDICINE

## 2019-11-15 PROCEDURE — 99900035 HC TECH TIME PER 15 MIN (STAT)

## 2019-11-15 PROCEDURE — 36415 COLL VENOUS BLD VENIPUNCTURE: CPT

## 2019-11-15 PROCEDURE — 25000003 PHARM REV CODE 250

## 2019-11-15 PROCEDURE — 36600 WITHDRAWAL OF ARTERIAL BLOOD: CPT

## 2019-11-15 RX ORDER — CLONIDINE HYDROCHLORIDE 0.1 MG/1
TABLET ORAL
Status: COMPLETED
Start: 2019-11-15 | End: 2019-11-15

## 2019-11-15 RX ORDER — METOPROLOL TARTRATE 25 MG/1
50 TABLET, FILM COATED ORAL 2 TIMES DAILY
Status: CANCELLED | OUTPATIENT
Start: 2019-11-15

## 2019-11-15 RX ORDER — CLONIDINE HYDROCHLORIDE 0.1 MG/1
0.2 TABLET ORAL ONCE
Status: COMPLETED | OUTPATIENT
Start: 2019-11-15 | End: 2019-11-15

## 2019-11-15 RX ADMIN — LEVETIRACETAM 500 MG: 250 TABLET ORAL at 09:11

## 2019-11-15 RX ADMIN — CLONIDINE HYDROCHLORIDE 0.2 MG: 0.1 TABLET ORAL at 04:11

## 2019-11-15 RX ADMIN — OLANZAPINE 5 MG: 5 TABLET, ORALLY DISINTEGRATING ORAL at 09:11

## 2019-11-15 RX ADMIN — Medication 100 MG: at 10:11

## 2019-11-15 RX ADMIN — FOLIC ACID 1 MG: 1 TABLET ORAL at 09:11

## 2019-11-15 RX ADMIN — AMLODIPINE BESYLATE 5 MG: 2.5 TABLET ORAL at 09:11

## 2019-11-15 RX ADMIN — SENNOSIDES AND DOCUSATE SODIUM 1 TABLET: 8.6; 5 TABLET ORAL at 09:11

## 2019-11-15 RX ADMIN — OLANZAPINE 10 MG: 5 TABLET, ORALLY DISINTEGRATING ORAL at 09:11

## 2019-11-15 RX ADMIN — METOPROLOL TARTRATE 25 MG: 25 TABLET ORAL at 09:11

## 2019-11-15 RX ADMIN — FAMOTIDINE 20 MG: 10 INJECTION INTRAVENOUS at 09:11

## 2019-11-15 NOTE — SUBJECTIVE & OBJECTIVE
Past Medical History:   Diagnosis Date    CVA (cerebral infarction)     Hypertension     Non-compliance     Seizure disorder        History reviewed. No pertinent surgical history.    Review of patient's allergies indicates:   Allergen Reactions    Ace inhibitors     Bactrim [sulfamethoxazole-trimethoprim] Rash       No current facility-administered medications on file prior to encounter.      Current Outpatient Medications on File Prior to Encounter   Medication Sig    acetaminophen (TYLENOL) 325 MG tablet Take 650 mg by mouth every 6 (six) hours as needed for Pain.    aluminum-magnesium hydroxide-simethicone (MAALOX) 200-200-20 mg/5 mL Susp Take 30 mLs by mouth as needed (for indigestion).    amlodipine (NORVASC) 5 MG tablet Take 5 mg by mouth once daily.     folic acid (FOLVITE) 1 MG tablet Take 1 mg by mouth once daily.    iron polysaccharides (NIFEREX) 150 mg iron Cap Take 1 capsule (150 mg total) by mouth once daily.    lacosamide (VIMPAT) 200 mg Tab tablet Take 1 tablet (200 mg total) by mouth 2 (two) times daily.    levetiracetam (KEPPRA) 500 MG Tab Take 1 tablet (500 mg total) by mouth 2 (two) times daily.    magnesium hydroxide 400 mg/5 ml (MILK OF MAGNESIA) 400 mg/5 mL Susp Take 20 mLs by mouth daily as needed (for constipation).    metoprolol tartrate (LOPRESSOR) 25 MG tablet Take 25 mg by mouth 2 (two) times daily.    multivitamin (THERAGRAN) tablet Take 1 tablet by mouth once daily.    olanzapine (ZYPREXA) 5 MG tablet Take 5 mg by mouth once daily.    olanzapine (ZYPREXA) 7.5 MG tablet Take 7.5 mg by mouth every evening.    senna-docusate 8.6-50 mg (PERICOLACE) 8.6-50 mg per tablet Take 1 tablet by mouth once daily.    thiamine 100 MG tablet Take 1 tablet (100 mg total) by mouth once daily.     Family History     None        Tobacco Use    Smoking status: Current Every Day Smoker     Packs/day: 0.50     Types: Cigarettes   Substance and Sexual Activity    Alcohol use: Yes      Alcohol/week: 4.2 standard drinks     Types: 5 drink(s) per week    Drug use: No    Sexual activity: Not on file     Review of Systems   HENT: Negative.    Respiratory: Negative.    Cardiovascular: Negative.    Neurological: Positive for seizures.   Psychiatric/Behavioral: Positive for confusion.   All other systems reviewed and are negative.    Objective:     Vital Signs (Most Recent):  Temp: 97.8 °F (36.6 °C) (11/15/19 0700)  Pulse: 71 (11/15/19 1000)  Resp: 14 (11/15/19 1000)  BP: (!) 167/84 (11/15/19 1000)  SpO2: 100 % (11/15/19 1000) Vital Signs (24h Range):  Temp:  [97.4 °F (36.3 °C)-98.4 °F (36.9 °C)] 97.8 °F (36.6 °C)  Pulse:  [] 71  Resp:  [14-44] 14  SpO2:  [88 %-100 %] 100 %  BP: (117-168)/() 167/84     Weight: 83 kg (182 lb 15.7 oz)  Body mass index is 26.26 kg/m².    Physical Exam   Constitutional: Vital signs are normal. He appears well-developed and well-nourished. He is active.   HENT:   Head: Normocephalic and atraumatic.   Eyes: Pupils are equal, round, and reactive to light. Conjunctivae are normal.   Neck: Normal range of motion. Neck supple. No thyromegaly present.   Cardiovascular: Normal rate, regular rhythm and normal heart sounds.   Pulmonary/Chest: Effort normal and breath sounds normal.   Abdominal: Soft. Normal appearance and bowel sounds are normal. There is no tenderness.   Musculoskeletal: Normal range of motion.   Skin: Skin is warm and dry.   Psychiatric: He has a normal mood and affect. His behavior is normal. Judgment and thought content normal.   Nursing note and vitals reviewed.        CRANIAL NERVES     CN III, IV, VI   Pupils are equal, round, and reactive to light.       Significant Labs:   Recent Lab Results       11/15/19  0453   11/14/19  1055        Allens Test Pass Pass     Site RR RR     DelSys Room Air Aero Mask     FiO2   60     Mode   SPONT     POC BE -2 -10     POC HCO3 23.9 17.4     POC PCO2 41.9 40.8     POC PH 7.363 7.237     POC PO2 95 220     POC  SATURATED O2 97 100     POC TCO2 25 19     Sample ARTERIAL ARTERIAL           Significant Imaging: I have reviewed and interpreted all pertinent imaging results/findings within the past 24 hours.

## 2019-11-15 NOTE — H&P
[facility] - ICU Hospital Medicine  History & Physical    Patient Name: Carlos Maravilla  MRN: [id number]  Admission Date: 11/14/2019  Attending Physician: Yvonne Raphael III, MD  Primary Care Provider: Yvonne Raphael III, MD         Patient information was obtained from ER records.     Subjective:     Principal Problem:Status epilepticus    Chief Complaint:   Chief Complaint   Patient presents with    Seizures        HPI: Admitted after having seizures.  Lasted approx 90 min  Multiple lab abnormalities  Admit to icu    Past Medical History:   Diagnosis Date    CVA (cerebral infarction)     Hypertension     Non-compliance     Seizure disorder        History reviewed. No pertinent surgical history.    Review of patient's allergies indicates:   Allergen Reactions    Ace inhibitors     Bactrim [sulfamethoxazole-trimethoprim] Rash       No current facility-administered medications on file prior to encounter.      Current Outpatient Medications on File Prior to Encounter   Medication Sig    acetaminophen (TYLENOL) 325 MG tablet Take 650 mg by mouth every 6 (six) hours as needed for Pain.    aluminum-magnesium hydroxide-simethicone (MAALOX) 200-200-20 mg/5 mL Susp Take 30 mLs by mouth as needed (for indigestion).    amlodipine (NORVASC) 5 MG tablet Take 5 mg by mouth once daily.     folic acid (FOLVITE) 1 MG tablet Take 1 mg by mouth once daily.    iron polysaccharides (NIFEREX) 150 mg iron Cap Take 1 capsule (150 mg total) by mouth once daily.    lacosamide (VIMPAT) 200 mg Tab tablet Take 1 tablet (200 mg total) by mouth 2 (two) times daily.    levetiracetam (KEPPRA) 500 MG Tab Take 1 tablet (500 mg total) by mouth 2 (two) times daily.    magnesium hydroxide 400 mg/5 ml (MILK OF MAGNESIA) 400 mg/5 mL Susp Take 20 mLs by mouth daily as needed (for constipation).    metoprolol tartrate (LOPRESSOR) 25 MG tablet Take 25 mg by mouth 2 (two) times daily.    multivitamin (THERAGRAN) tablet Take  1 tablet by mouth once daily.    olanzapine (ZYPREXA) 5 MG tablet Take 5 mg by mouth once daily.    olanzapine (ZYPREXA) 7.5 MG tablet Take 7.5 mg by mouth every evening.    senna-docusate 8.6-50 mg (PERICOLACE) 8.6-50 mg per tablet Take 1 tablet by mouth once daily.    thiamine 100 MG tablet Take 1 tablet (100 mg total) by mouth once daily.     Family History     None        Tobacco Use    Smoking status: Current Every Day Smoker     Packs/day: 0.50     Types: Cigarettes   Substance and Sexual Activity    Alcohol use: Yes     Alcohol/week: 4.2 standard drinks     Types: 5 drink(s) per week    Drug use: No    Sexual activity: Not on file     Review of Systems   Constitutional: Negative.    HENT: Negative.    Gastrointestinal: Negative.    Neurological: Positive for seizures.   Psychiatric/Behavioral: Positive for confusion.   All other systems reviewed and are negative.    Objective:     Vital Signs (Most Recent):  Temp: 97.8 °F (36.6 °C) (11/15/19 0700)  Pulse: 71 (11/15/19 1000)  Resp: 14 (11/15/19 1000)  BP: (!) 167/84 (11/15/19 1000)  SpO2: 100 % (11/15/19 1000) Vital Signs (24h Range):  Temp:  [97.4 °F (36.3 °C)-98.4 °F (36.9 °C)] 97.8 °F (36.6 °C)  Pulse:  [] 71  Resp:  [14-44] 14  SpO2:  [88 %-100 %] 100 %  BP: (111-168)/() 167/84     Weight: 83 kg (182 lb 15.7 oz)  Body mass index is 26.26 kg/m².    Physical Exam   Constitutional: Vital signs are normal. He appears well-developed and well-nourished. He is active.   HENT:   Head: Normocephalic and atraumatic.   Eyes: Pupils are equal, round, and reactive to light. Conjunctivae are normal.   Neck: Normal range of motion. Neck supple. No thyromegaly present.   Cardiovascular: Normal rate, regular rhythm and normal heart sounds.   Pulmonary/Chest: Effort normal and breath sounds normal.   Abdominal: Soft. Normal appearance and bowel sounds are normal. There is no tenderness.   Musculoskeletal: Normal range of motion.   Neurological:   Post  ictal   Skin: Skin is warm and dry.   Psychiatric: He has a normal mood and affect. His behavior is normal. Judgment and thought content normal.   Nursing note and vitals reviewed.        CRANIAL NERVES     CN III, IV, VI   Pupils are equal, round, and reactive to light.       Significant Labs: All pertinent labs within the past 24 hours have been reviewed.    Significant Imaging: I have reviewed and interpreted all pertinent imaging results/findings within the past 24 hours.    Assessment/Plan:     * Status epilepticus  11.14  Admit to icu  Adjust meds        VTE Risk Mitigation (From admission, onward)         Ordered     IP VTE HIGH RISK PATIENT  Once      11/14/19 1324     Place sequential compression device  Until discontinued      11/14/19 1324                   Yvonne Raphael III, MD  Department of Hospital Medicine   [facility]

## 2019-11-15 NOTE — SUBJECTIVE & OBJECTIVE
Past Medical History:   Diagnosis Date    CVA (cerebral infarction)     Hypertension     Non-compliance     Seizure disorder        History reviewed. No pertinent surgical history.    Review of patient's allergies indicates:   Allergen Reactions    Ace inhibitors     Bactrim [sulfamethoxazole-trimethoprim] Rash       No current facility-administered medications on file prior to encounter.      Current Outpatient Medications on File Prior to Encounter   Medication Sig    acetaminophen (TYLENOL) 325 MG tablet Take 650 mg by mouth every 6 (six) hours as needed for Pain.    aluminum-magnesium hydroxide-simethicone (MAALOX) 200-200-20 mg/5 mL Susp Take 30 mLs by mouth as needed (for indigestion).    amlodipine (NORVASC) 5 MG tablet Take 5 mg by mouth once daily.     folic acid (FOLVITE) 1 MG tablet Take 1 mg by mouth once daily.    iron polysaccharides (NIFEREX) 150 mg iron Cap Take 1 capsule (150 mg total) by mouth once daily.    lacosamide (VIMPAT) 200 mg Tab tablet Take 1 tablet (200 mg total) by mouth 2 (two) times daily.    levetiracetam (KEPPRA) 500 MG Tab Take 1 tablet (500 mg total) by mouth 2 (two) times daily.    magnesium hydroxide 400 mg/5 ml (MILK OF MAGNESIA) 400 mg/5 mL Susp Take 20 mLs by mouth daily as needed (for constipation).    metoprolol tartrate (LOPRESSOR) 25 MG tablet Take 25 mg by mouth 2 (two) times daily.    multivitamin (THERAGRAN) tablet Take 1 tablet by mouth once daily.    olanzapine (ZYPREXA) 5 MG tablet Take 5 mg by mouth once daily.    olanzapine (ZYPREXA) 7.5 MG tablet Take 7.5 mg by mouth every evening.    senna-docusate 8.6-50 mg (PERICOLACE) 8.6-50 mg per tablet Take 1 tablet by mouth once daily.    thiamine 100 MG tablet Take 1 tablet (100 mg total) by mouth once daily.     Family History     None        Tobacco Use    Smoking status: Current Every Day Smoker     Packs/day: 0.50     Types: Cigarettes   Substance and Sexual Activity    Alcohol use: Yes      Alcohol/week: 4.2 standard drinks     Types: 5 drink(s) per week    Drug use: No    Sexual activity: Not on file     Review of Systems   Constitutional: Negative.    HENT: Negative.    Gastrointestinal: Negative.    Neurological: Positive for seizures.   Psychiatric/Behavioral: Positive for confusion.   All other systems reviewed and are negative.    Objective:     Vital Signs (Most Recent):  Temp: 97.8 °F (36.6 °C) (11/15/19 0700)  Pulse: 71 (11/15/19 1000)  Resp: 14 (11/15/19 1000)  BP: (!) 167/84 (11/15/19 1000)  SpO2: 100 % (11/15/19 1000) Vital Signs (24h Range):  Temp:  [97.4 °F (36.3 °C)-98.4 °F (36.9 °C)] 97.8 °F (36.6 °C)  Pulse:  [] 71  Resp:  [14-44] 14  SpO2:  [88 %-100 %] 100 %  BP: (111-168)/() 167/84     Weight: 83 kg (182 lb 15.7 oz)  Body mass index is 26.26 kg/m².    Physical Exam   Constitutional: Vital signs are normal. He appears well-developed and well-nourished. He is active.   HENT:   Head: Normocephalic and atraumatic.   Eyes: Pupils are equal, round, and reactive to light. Conjunctivae are normal.   Neck: Normal range of motion. Neck supple. No thyromegaly present.   Cardiovascular: Normal rate, regular rhythm and normal heart sounds.   Pulmonary/Chest: Effort normal and breath sounds normal.   Abdominal: Soft. Normal appearance and bowel sounds are normal. There is no tenderness.   Musculoskeletal: Normal range of motion.   Neurological:   Post ictal   Skin: Skin is warm and dry.   Psychiatric: He has a normal mood and affect. His behavior is normal. Judgment and thought content normal.   Nursing note and vitals reviewed.        CRANIAL NERVES     CN III, IV, VI   Pupils are equal, round, and reactive to light.       Significant Labs: All pertinent labs within the past 24 hours have been reviewed.    Significant Imaging: I have reviewed and interpreted all pertinent imaging results/findings within the past 24 hours.

## 2019-11-15 NOTE — PROGRESS NOTES
[facility] - ICU Hospital Medicine  Progress Note    Patient Name: Carlos Maravilla  MRN: [id number]  Patient Class: IP- Inpatient   Admission Date: 11/14/2019  Length of Stay: 1 days  Attending Physician: Yvonne Raphael III, MD  Primary Care Provider: Yvonne Raphael III, MD        Subjective:     Principal Problem:Status epilepticus   Lying in bed  Non verbal        HPI:  Admitted after having seizures.  Lasted approx 90 min  Multiple lab abnormalities  Admit to icu    Overview/Hospital Course:  No notes on file    Past Medical History:   Diagnosis Date    CVA (cerebral infarction)     Hypertension     Non-compliance     Seizure disorder        History reviewed. No pertinent surgical history.    Review of patient's allergies indicates:   Allergen Reactions    Ace inhibitors     Bactrim [sulfamethoxazole-trimethoprim] Rash       No current facility-administered medications on file prior to encounter.      Current Outpatient Medications on File Prior to Encounter   Medication Sig    acetaminophen (TYLENOL) 325 MG tablet Take 650 mg by mouth every 6 (six) hours as needed for Pain.    aluminum-magnesium hydroxide-simethicone (MAALOX) 200-200-20 mg/5 mL Susp Take 30 mLs by mouth as needed (for indigestion).    amlodipine (NORVASC) 5 MG tablet Take 5 mg by mouth once daily.     folic acid (FOLVITE) 1 MG tablet Take 1 mg by mouth once daily.    iron polysaccharides (NIFEREX) 150 mg iron Cap Take 1 capsule (150 mg total) by mouth once daily.    lacosamide (VIMPAT) 200 mg Tab tablet Take 1 tablet (200 mg total) by mouth 2 (two) times daily.    levetiracetam (KEPPRA) 500 MG Tab Take 1 tablet (500 mg total) by mouth 2 (two) times daily.    magnesium hydroxide 400 mg/5 ml (MILK OF MAGNESIA) 400 mg/5 mL Susp Take 20 mLs by mouth daily as needed (for constipation).    metoprolol tartrate (LOPRESSOR) 25 MG tablet Take 25 mg by mouth 2 (two) times daily.    multivitamin (THERAGRAN) tablet Take 1  tablet by mouth once daily.    olanzapine (ZYPREXA) 5 MG tablet Take 5 mg by mouth once daily.    olanzapine (ZYPREXA) 7.5 MG tablet Take 7.5 mg by mouth every evening.    senna-docusate 8.6-50 mg (PERICOLACE) 8.6-50 mg per tablet Take 1 tablet by mouth once daily.    thiamine 100 MG tablet Take 1 tablet (100 mg total) by mouth once daily.     Family History     None        Tobacco Use    Smoking status: Current Every Day Smoker     Packs/day: 0.50     Types: Cigarettes   Substance and Sexual Activity    Alcohol use: Yes     Alcohol/week: 4.2 standard drinks     Types: 5 drink(s) per week    Drug use: No    Sexual activity: Not on file     Review of Systems   HENT: Negative.    Respiratory: Negative.    Cardiovascular: Negative.    Neurological: Positive for seizures.   Psychiatric/Behavioral: Positive for confusion.   All other systems reviewed and are negative.    Objective:     Vital Signs (Most Recent):  Temp: 97.8 °F (36.6 °C) (11/15/19 0700)  Pulse: 71 (11/15/19 1000)  Resp: 14 (11/15/19 1000)  BP: (!) 167/84 (11/15/19 1000)  SpO2: 100 % (11/15/19 1000) Vital Signs (24h Range):  Temp:  [97.4 °F (36.3 °C)-98.4 °F (36.9 °C)] 97.8 °F (36.6 °C)  Pulse:  [] 71  Resp:  [14-44] 14  SpO2:  [88 %-100 %] 100 %  BP: (117-168)/() 167/84     Weight: 83 kg (182 lb 15.7 oz)  Body mass index is 26.26 kg/m².    Physical Exam   Constitutional: Vital signs are normal. He appears well-developed and well-nourished. He is active.   HENT:   Head: Normocephalic and atraumatic.   Eyes: Pupils are equal, round, and reactive to light. Conjunctivae are normal.   Neck: Normal range of motion. Neck supple. No thyromegaly present.   Cardiovascular: Normal rate, regular rhythm and normal heart sounds.   Pulmonary/Chest: Effort normal and breath sounds normal.   Abdominal: Soft. Normal appearance and bowel sounds are normal. There is no tenderness.   Musculoskeletal: Normal range of motion.   Skin: Skin is warm and dry.    Psychiatric: He has a normal mood and affect. His behavior is normal. Judgment and thought content normal.   Nursing note and vitals reviewed.        CRANIAL NERVES     CN III, IV, VI   Pupils are equal, round, and reactive to light.       Significant Labs:   Recent Lab Results       11/15/19  0453   11/14/19  1055        Allens Test Pass Pass     Site RR RR     DelSys Room Air Aero Mask     FiO2   60     Mode   SPONT     POC BE -2 -10     POC HCO3 23.9 17.4     POC PCO2 41.9 40.8     POC PH 7.363 7.237     POC PO2 95 220     POC SATURATED O2 97 100     POC TCO2 25 19     Sample ARTERIAL ARTERIAL           Significant Imaging: I have reviewed and interpreted all pertinent imaging results/findings within the past 24 hours.      Assessment/Plan:      * Status epilepticus  11.14  Admit to icu  Adjust meds    11.15  Not responsive to voice  Continue support        VTE Risk Mitigation (From admission, onward)         Ordered     IP VTE HIGH RISK PATIENT  Once      11/14/19 1324     Place sequential compression device  Until discontinued      11/14/19 1324                      Yvonne Raphael III, MD  Department of Hospital Medicine   [facility] - ICU

## 2019-11-16 VITALS
RESPIRATION RATE: 16 BRPM | SYSTOLIC BLOOD PRESSURE: 175 MMHG | DIASTOLIC BLOOD PRESSURE: 91 MMHG | WEIGHT: 183 LBS | HEIGHT: 70 IN | BODY MASS INDEX: 26.2 KG/M2 | HEART RATE: 75 BPM | OXYGEN SATURATION: 96 % | TEMPERATURE: 96 F

## 2019-11-16 LAB
ALBUMIN SERPL BCP-MCNC: 3.9 G/DL (ref 3.5–5.2)
ALP SERPL-CCNC: 74 U/L (ref 55–135)
ALT SERPL W/O P-5'-P-CCNC: 36 U/L (ref 10–44)
ANION GAP SERPL CALC-SCNC: 10 MMOL/L (ref 8–16)
AST SERPL-CCNC: 89 U/L (ref 10–40)
BASOPHILS # BLD AUTO: 0.03 K/UL (ref 0–0.2)
BASOPHILS NFR BLD: 0.4 % (ref 0–1.9)
BILIRUB SERPL-MCNC: 0.8 MG/DL (ref 0.1–1)
BUN SERPL-MCNC: 23 MG/DL (ref 8–23)
CALCIUM SERPL-MCNC: 9.5 MG/DL (ref 8.7–10.5)
CHLORIDE SERPL-SCNC: 111 MMOL/L (ref 95–110)
CO2 SERPL-SCNC: 22 MMOL/L (ref 23–29)
CREAT SERPL-MCNC: 1.5 MG/DL (ref 0.5–1.4)
DIFFERENTIAL METHOD: ABNORMAL
EOSINOPHIL # BLD AUTO: 0.1 K/UL (ref 0–0.5)
EOSINOPHIL NFR BLD: 1.5 % (ref 0–8)
ERYTHROCYTE [DISTWIDTH] IN BLOOD BY AUTOMATED COUNT: 12.3 % (ref 11.5–14.5)
EST. GFR  (AFRICAN AMERICAN): 51.9 ML/MIN/1.73 M^2
EST. GFR  (NON AFRICAN AMERICAN): 44.9 ML/MIN/1.73 M^2
GLUCOSE SERPL-MCNC: 87 MG/DL (ref 70–110)
HCT VFR BLD AUTO: 41 % (ref 40–54)
HGB BLD-MCNC: 13.2 G/DL (ref 14–18)
IMM GRANULOCYTES # BLD AUTO: 0.05 K/UL (ref 0–0.04)
IMM GRANULOCYTES NFR BLD AUTO: 0.6 % (ref 0–0.5)
LYMPHOCYTES # BLD AUTO: 2.5 K/UL (ref 1–4.8)
LYMPHOCYTES NFR BLD: 30.6 % (ref 18–48)
MAGNESIUM SERPL-MCNC: 2 MG/DL (ref 1.6–2.6)
MCH RBC QN AUTO: 28.7 PG (ref 27–31)
MCHC RBC AUTO-ENTMCNC: 32.2 G/DL (ref 32–36)
MCV RBC AUTO: 89 FL (ref 82–98)
MONOCYTES # BLD AUTO: 0.9 K/UL (ref 0.3–1)
MONOCYTES NFR BLD: 11 % (ref 4–15)
NEUTROPHILS # BLD AUTO: 4.6 K/UL (ref 1.8–7.7)
NEUTROPHILS NFR BLD: 55.9 % (ref 38–73)
NRBC BLD-RTO: 0 /100 WBC
PLATELET # BLD AUTO: 217 K/UL (ref 150–350)
PMV BLD AUTO: 10.2 FL (ref 9.2–12.9)
POTASSIUM SERPL-SCNC: 3.9 MMOL/L (ref 3.5–5.1)
PROT SERPL-MCNC: 7.5 G/DL (ref 6–8.4)
RBC # BLD AUTO: 4.6 M/UL (ref 4.6–6.2)
SODIUM SERPL-SCNC: 143 MMOL/L (ref 136–145)
WBC # BLD AUTO: 8.21 K/UL (ref 3.9–12.7)

## 2019-11-16 PROCEDURE — 80053 COMPREHEN METABOLIC PANEL: CPT

## 2019-11-16 PROCEDURE — 25000003 PHARM REV CODE 250: Performed by: INTERNAL MEDICINE

## 2019-11-16 PROCEDURE — 94760 N-INVAS EAR/PLS OXIMETRY 1: CPT

## 2019-11-16 PROCEDURE — S0028 INJECTION, FAMOTIDINE, 20 MG: HCPCS | Performed by: INTERNAL MEDICINE

## 2019-11-16 PROCEDURE — 85025 COMPLETE CBC W/AUTO DIFF WBC: CPT

## 2019-11-16 PROCEDURE — 83735 ASSAY OF MAGNESIUM: CPT

## 2019-11-16 PROCEDURE — 36415 COLL VENOUS BLD VENIPUNCTURE: CPT

## 2019-11-16 RX ADMIN — FOLIC ACID 1 MG: 1 TABLET ORAL at 10:11

## 2019-11-16 RX ADMIN — AMLODIPINE BESYLATE 5 MG: 2.5 TABLET ORAL at 10:11

## 2019-11-16 RX ADMIN — LEVETIRACETAM 500 MG: 250 TABLET ORAL at 10:11

## 2019-11-16 RX ADMIN — SENNOSIDES AND DOCUSATE SODIUM 1 TABLET: 8.6; 5 TABLET ORAL at 10:11

## 2019-11-16 RX ADMIN — METOPROLOL TARTRATE 25 MG: 25 TABLET ORAL at 10:11

## 2019-11-16 RX ADMIN — FAMOTIDINE 20 MG: 10 INJECTION INTRAVENOUS at 10:11

## 2019-11-16 RX ADMIN — OLANZAPINE 5 MG: 5 TABLET, ORALLY DISINTEGRATING ORAL at 10:11

## 2019-11-16 RX ADMIN — Medication 100 MG: at 10:11

## 2019-11-16 NOTE — DISCHARGE SUMMARY
[facility] - Med Surg Hospital Medicine  Discharge Summary      Patient Name: Carlos Maravilla  MRN: [id number]  Admission Date: 11/14/2019  Hospital Length of Stay: 2 days  Discharge Date and Time: No discharge date for patient encounter.  Attending Physician: Yvonne Raphael III, MD   Discharging Provider: Yvonne aRphael III, MD  Primary Care Provider: Yvonne Raphael III, MD      HPI:   Admitted after having seizures.  Lasted approx 90 min  Multiple lab abnormalities  Admit to icu    * No surgery found *      Hospital Course:   11.16  Admitted with status epilepticus  Required admission to icu  Meds adjusted  keppra added  Back to baseline  Sitting up in bed  Holding remote control  Eyes open  Alert  Non verbal  A/p:  Back to [facility]  Close f/u     Consults:   Consults (From admission, onward)        Status Ordering Provider     IP consult to case management  Once     Provider:  (Not yet assigned)    YVONNE Zambrano III          No new Assessment & Plan notes have been filed under this hospital service since the last note was generated.  Service: Hospital Medicine    Final Active Diagnoses:    Diagnosis Date Noted POA    PRINCIPAL PROBLEM:  Status epilepticus [G40.901] 11/14/2019 Yes    Seizure [R56.9] 11/15/2019 Yes    AMS (altered mental status) [R41.82] 11/14/2019 Yes    Seizure disorder [G40.909]  Yes      Problems Resolved During this Admission:       Discharged Condition: poor    Disposition: Long Term Care    Follow Up:    Patient Instructions:   No discharge procedures on file.    Significant Diagnostic Studies: Labs:   CMP   Recent Labs   Lab 11/14/19  1000 11/15/19  1116 11/16/19  0530    143 143   K 3.7 4.1 3.9    111* 111*   CO2 8* 24 22*   * 99 87   BUN 16 20 23   CREATININE 1.9* 1.5* 1.5*   CALCIUM 9.4 9.3 9.5   PROT  --   --  7.5   ALBUMIN  --   --  3.9   BILITOT  --   --  0.8   ALKPHOS  --   --  74   AST  --   --  89*   ALT  --   --  36   ANIONGAP 30* 8  10   ESTGFRAFRICA 39.0* 51.9* 51.9*   EGFRNONAA 33.7* 44.9* 44.9*       Pending Diagnostic Studies:     None         Medications:  Reconciled Home Medications:      Medication List      CONTINUE taking these medications    acetaminophen 325 MG tablet  Commonly known as:  TYLENOL  Take 650 mg by mouth every 6 (six) hours as needed for Pain.     aluminum-magnesium hydroxide-simethicone 200-200-20 mg/5 mL Susp  Commonly known as:  MAALOX  Take 30 mLs by mouth as needed (for indigestion).     amLODIPine 5 MG tablet  Commonly known as:  NORVASC  Take 5 mg by mouth once daily.     folic acid 1 MG tablet  Commonly known as:  FOLVITE  Take 1 mg by mouth once daily.     iron polysaccharides 150 mg iron Cap  Commonly known as:  NIFEREX  Take 1 capsule (150 mg total) by mouth once daily.     lacosamide 200 mg Tab tablet  Commonly known as:  Vimpat  Take 1 tablet (200 mg total) by mouth 2 (two) times daily.     levETIRAcetam 500 MG Tab  Commonly known as:  Keppra  Take 1 tablet (500 mg total) by mouth 2 (two) times daily.     metoprolol tartrate 25 MG tablet  Commonly known as:  LOPRESSOR  Take 25 mg by mouth 2 (two) times daily.     Milk of Magnesia 400 mg/5 mL Susp  Generic drug:  magnesium hydroxide 400 mg/5 ml  Take 20 mLs by mouth daily as needed (for constipation).     multivitamin tablet  Commonly known as:  THERAGRAN  Take 1 tablet by mouth once daily.     * OLANZapine 5 MG tablet  Commonly known as:  ZyPREXA  Take 5 mg by mouth once daily.     * OLANZapine 7.5 MG tablet  Commonly known as:  ZyPREXA  Take 7.5 mg by mouth every evening.     senna-docusate 8.6-50 mg 8.6-50 mg per tablet  Commonly known as:  PERICOLACE  Take 1 tablet by mouth once daily.     thiamine 100 MG tablet  Take 1 tablet (100 mg total) by mouth once daily.         * This list has 2 medication(s) that are the same as other medications prescribed for you. Read the directions carefully, and ask your doctor or other care provider to review them with  you.                Indwelling Lines/Drains at time of discharge:   Lines/Drains/Airways     None                 Time spent on the discharge of patient: 50 minutes  Patient was seen and examined on the date of discharge and determined to be suitable for discharge.         Yvonne Raphael III, MD  Department of Hospital Medicine  [facility] - Med Surg

## 2019-11-16 NOTE — NURSING
REPORT CALLED TO [facility] TO WEEKEND SUPERVISOR, TRANSPORTATION BEING ARRANGED.

## 2019-11-16 NOTE — PLAN OF CARE
[facility]    FACILITY TRANSFER ORDERS      Patient Name: Carlos Maravilla  YOB: 1945    PCP: Yvonne Raphael III, MD   PCP Address: [address] [place] [address] / [place] [address]  PCP Phone Number: [phone]  PCP Fax: [phone]    Encounter Date: 11/16/2019    Admit to: [facility]    Vital Signs:  Routine    Diagnoses:   Active Hospital Problems    Diagnosis  POA    *Status epilepticus [G40.901]  Yes    Seizure [R56.9]  Yes    AMS (altered mental status) [R41.82]  Yes    Seizure disorder [G40.909]  Yes      Resolved Hospital Problems   No resolved problems to display.       Allergies:  Review of patient's allergies indicates:   Allergen Reactions    Ace inhibitors     Bactrim [sulfamethoxazole-trimethoprim] Rash       Diet: regular diet    Activities: Activity as tolerated    Nursing: yes     Labs: none Once     CONSULTS:    Physical Therapy to evaluate and treat. , Occupational Therapy to evaluate and treat., Speech Therapy to evaluate and treat for Language and Cognition. and  to evaluate for community resources/long-range planning.    MISCELLANEOUS CARE:  Routine Skin for Bedridden Patients: Apply moisture barrier cream to all skin folds and wet areas in perineal area daily and after baths and all bowel movements.    WOUND CARE ORDERS  None    Medications: Review discharge medications with patient and family and provide education.      Current Discharge Medication List      CONTINUE these medications which have NOT CHANGED    Details   acetaminophen (TYLENOL) 325 MG tablet Take 650 mg by mouth every 6 (six) hours as needed for Pain.      aluminum-magnesium hydroxide-simethicone (MAALOX) 200-200-20 mg/5 mL Susp Take 30 mLs by mouth as needed (for indigestion).      amlodipine (NORVASC) 5 MG tablet Take 5 mg by mouth once daily.       folic acid (FOLVITE) 1 MG tablet Take 1 mg by mouth once daily.      iron polysaccharides (NIFEREX) 150 mg iron Cap Take 1  capsule (150 mg total) by mouth once daily.  Refills: 0      lacosamide (VIMPAT) 200 mg Tab tablet Take 1 tablet (200 mg total) by mouth 2 (two) times daily.  Qty: 60 tablet, Refills: 5    Associated Diagnoses: Encounter for long-term (current) use of high-risk medication      levetiracetam (KEPPRA) 500 MG Tab Take 1 tablet (500 mg total) by mouth 2 (two) times daily.  Qty: 60 tablet, Refills: 11      magnesium hydroxide 400 mg/5 ml (MILK OF MAGNESIA) 400 mg/5 mL Susp Take 20 mLs by mouth daily as needed (for constipation).      metoprolol tartrate (LOPRESSOR) 25 MG tablet Take 25 mg by mouth 2 (two) times daily.      multivitamin (THERAGRAN) tablet Take 1 tablet by mouth once daily.      !! olanzapine (ZYPREXA) 5 MG tablet Take 5 mg by mouth once daily.      !! olanzapine (ZYPREXA) 7.5 MG tablet Take 7.5 mg by mouth every evening.      senna-docusate 8.6-50 mg (PERICOLACE) 8.6-50 mg per tablet Take 1 tablet by mouth once daily.      thiamine 100 MG tablet Take 1 tablet (100 mg total) by mouth once daily.       !! - Potential duplicate medications found. Please discuss with provider.               _________________________________  Yvonne Raphael III, MD  11/16/2019

## 2019-11-16 NOTE — PLAN OF CARE
11/16/19 0954   Post-Acute Status   Post-Acute Authorization Placement   Post-Acute Placement Status Set-up Complete   Patient to return to [facility] where he is in long term care.

## 2019-11-16 NOTE — PLAN OF CARE
11/16/19 0953   Final Note   Assessment Type Final Discharge Note   Anticipated Discharge Disposition Long Term   What phone number can be called within the next 1-3 days to see how you are doing after discharge? [phone]   Patient to return to [facility] where he is in long term care there. He will be followed by Dr Raphael there.

## 2019-11-16 NOTE — HOSPITAL COURSE
11.16  Admitted with status epilepticus  Required admission to icu  Meds adjusted  keppra added  Back to baseline  Sitting up in bed  Holding remote control  Eyes open  Alert  Non verbal  A/p:  Back to [facility]  Close f/u

## 2019-11-18 LAB — LEVETIRACETAM SERPL-MCNC: 2.7 UG/ML (ref 3–60)

## 2019-11-27 ENCOUNTER — LAB VISIT (OUTPATIENT)
Dept: LAB | Facility: HOSPITAL | Age: 74
End: 2019-11-27
Attending: INTERNAL MEDICINE
Payer: MEDICARE

## 2019-11-27 DIAGNOSIS — G40.109 SPECIAL SENSORY ATTACKS: Primary | ICD-10-CM

## 2019-11-27 DIAGNOSIS — I10 ESSENTIAL HYPERTENSION, MALIGNANT: ICD-10-CM

## 2019-11-27 DIAGNOSIS — D64.9 ANEMIA, UNSPECIFIED: ICD-10-CM

## 2019-11-27 DIAGNOSIS — F03.92: ICD-10-CM

## 2019-11-27 LAB
ANION GAP SERPL CALC-SCNC: 8 MMOL/L (ref 8–16)
BASOPHILS # BLD AUTO: 0.04 K/UL (ref 0–0.2)
BASOPHILS NFR BLD: 0.6 % (ref 0–1.9)
BUN SERPL-MCNC: 11 MG/DL (ref 8–23)
CALCIUM SERPL-MCNC: 8.6 MG/DL (ref 8.7–10.5)
CHLORIDE SERPL-SCNC: 102 MMOL/L (ref 95–110)
CO2 SERPL-SCNC: 24 MMOL/L (ref 23–29)
CREAT SERPL-MCNC: 1.2 MG/DL (ref 0.5–1.4)
DIFFERENTIAL METHOD: ABNORMAL
EOSINOPHIL # BLD AUTO: 0.2 K/UL (ref 0–0.5)
EOSINOPHIL NFR BLD: 3.3 % (ref 0–8)
ERYTHROCYTE [DISTWIDTH] IN BLOOD BY AUTOMATED COUNT: 12.1 % (ref 11.5–14.5)
EST. GFR  (AFRICAN AMERICAN): >60 ML/MIN/1.73 M^2
EST. GFR  (NON AFRICAN AMERICAN): 58.8 ML/MIN/1.73 M^2
GLUCOSE SERPL-MCNC: 78 MG/DL (ref 70–110)
HCT VFR BLD AUTO: 43.5 % (ref 40–54)
HGB BLD-MCNC: 13.9 G/DL (ref 14–18)
IMM GRANULOCYTES # BLD AUTO: 0.04 K/UL (ref 0–0.04)
IMM GRANULOCYTES NFR BLD AUTO: 0.6 % (ref 0–0.5)
LYMPHOCYTES # BLD AUTO: 2.5 K/UL (ref 1–4.8)
LYMPHOCYTES NFR BLD: 38.4 % (ref 18–48)
MCH RBC QN AUTO: 28.8 PG (ref 27–31)
MCHC RBC AUTO-ENTMCNC: 32 G/DL (ref 32–36)
MCV RBC AUTO: 90 FL (ref 82–98)
MONOCYTES # BLD AUTO: 0.7 K/UL (ref 0.3–1)
MONOCYTES NFR BLD: 10.8 % (ref 4–15)
NEUTROPHILS # BLD AUTO: 3 K/UL (ref 1.8–7.7)
NEUTROPHILS NFR BLD: 46.3 % (ref 38–73)
NRBC BLD-RTO: 0 /100 WBC
PLATELET # BLD AUTO: 232 K/UL (ref 150–350)
PMV BLD AUTO: 10.5 FL (ref 9.2–12.9)
POTASSIUM SERPL-SCNC: 3.5 MMOL/L (ref 3.5–5.1)
RBC # BLD AUTO: 4.82 M/UL (ref 4.6–6.2)
SODIUM SERPL-SCNC: 134 MMOL/L (ref 136–145)
WBC # BLD AUTO: 6.57 K/UL (ref 3.9–12.7)

## 2019-11-27 PROCEDURE — 80177 DRUG SCRN QUAN LEVETIRACETAM: CPT

## 2019-11-27 PROCEDURE — 80048 BASIC METABOLIC PNL TOTAL CA: CPT

## 2019-11-27 PROCEDURE — 85025 COMPLETE CBC W/AUTO DIFF WBC: CPT

## 2019-12-02 LAB — LEVETIRACETAM SERPL-MCNC: 12.9 UG/ML (ref 3–60)

## 2020-02-04 ENCOUNTER — LAB VISIT (OUTPATIENT)
Dept: LAB | Facility: HOSPITAL | Age: 75
End: 2020-02-04
Attending: INTERNAL MEDICINE
Payer: MEDICARE

## 2020-02-04 DIAGNOSIS — I10 ESSENTIAL HYPERTENSION, MALIGNANT: ICD-10-CM

## 2020-02-04 DIAGNOSIS — G40.109 SPECIAL SENSORY ATTACKS: Primary | ICD-10-CM

## 2020-02-04 LAB
ANION GAP SERPL CALC-SCNC: 7 MMOL/L (ref 8–16)
BASOPHILS # BLD AUTO: 0.03 K/UL (ref 0–0.2)
BASOPHILS NFR BLD: 0.4 % (ref 0–1.9)
BUN SERPL-MCNC: 11 MG/DL (ref 8–23)
CALCIUM SERPL-MCNC: 9.2 MG/DL (ref 8.7–10.5)
CHLORIDE SERPL-SCNC: 108 MMOL/L (ref 95–110)
CO2 SERPL-SCNC: 26 MMOL/L (ref 23–29)
CREAT SERPL-MCNC: 1.3 MG/DL (ref 0.5–1.4)
DIFFERENTIAL METHOD: ABNORMAL
EOSINOPHIL # BLD AUTO: 0.2 K/UL (ref 0–0.5)
EOSINOPHIL NFR BLD: 2.8 % (ref 0–8)
ERYTHROCYTE [DISTWIDTH] IN BLOOD BY AUTOMATED COUNT: 12.4 % (ref 11.5–14.5)
EST. GFR  (AFRICAN AMERICAN): >60 ML/MIN/1.73 M^2
EST. GFR  (NON AFRICAN AMERICAN): 53 ML/MIN/1.73 M^2
GLUCOSE SERPL-MCNC: 82 MG/DL (ref 70–110)
HCT VFR BLD AUTO: 42.8 % (ref 40–54)
HGB BLD-MCNC: 13.7 G/DL (ref 14–18)
IMM GRANULOCYTES # BLD AUTO: 0.04 K/UL (ref 0–0.04)
IMM GRANULOCYTES NFR BLD AUTO: 0.6 % (ref 0–0.5)
LYMPHOCYTES # BLD AUTO: 2.9 K/UL (ref 1–4.8)
LYMPHOCYTES NFR BLD: 42.4 % (ref 18–48)
MCH RBC QN AUTO: 28.8 PG (ref 27–31)
MCHC RBC AUTO-ENTMCNC: 32 G/DL (ref 32–36)
MCV RBC AUTO: 90 FL (ref 82–98)
MONOCYTES # BLD AUTO: 0.8 K/UL (ref 0.3–1)
MONOCYTES NFR BLD: 11.6 % (ref 4–15)
NEUTROPHILS # BLD AUTO: 2.9 K/UL (ref 1.8–7.7)
NEUTROPHILS NFR BLD: 42.2 % (ref 38–73)
NRBC BLD-RTO: 0 /100 WBC
PLATELET # BLD AUTO: 211 K/UL (ref 150–350)
PMV BLD AUTO: 10.2 FL (ref 9.2–12.9)
POTASSIUM SERPL-SCNC: 4 MMOL/L (ref 3.5–5.1)
RBC # BLD AUTO: 4.75 M/UL (ref 4.6–6.2)
SODIUM SERPL-SCNC: 141 MMOL/L (ref 136–145)
WBC # BLD AUTO: 6.89 K/UL (ref 3.9–12.7)

## 2020-02-04 PROCEDURE — 80177 DRUG SCRN QUAN LEVETIRACETAM: CPT

## 2020-02-04 PROCEDURE — 80048 BASIC METABOLIC PNL TOTAL CA: CPT

## 2020-02-04 PROCEDURE — 36415 COLL VENOUS BLD VENIPUNCTURE: CPT

## 2020-02-04 PROCEDURE — 85025 COMPLETE CBC W/AUTO DIFF WBC: CPT

## 2020-02-06 LAB — LEVETIRACETAM SERPL-MCNC: 13.3 UG/ML (ref 3–60)

## 2020-05-12 ENCOUNTER — LAB VISIT (OUTPATIENT)
Dept: LAB | Facility: HOSPITAL | Age: 75
End: 2020-05-12
Attending: INTERNAL MEDICINE
Payer: MEDICARE

## 2020-05-12 DIAGNOSIS — I10 ESSENTIAL HYPERTENSION, MALIGNANT: ICD-10-CM

## 2020-05-12 DIAGNOSIS — N19 RENAL FAILURE SYNDROME: ICD-10-CM

## 2020-05-12 DIAGNOSIS — G40.109 SPECIAL SENSORY ATTACKS: Primary | ICD-10-CM

## 2020-05-12 LAB
ALBUMIN SERPL BCP-MCNC: 3.6 G/DL (ref 3.5–5.2)
ALP SERPL-CCNC: 66 U/L (ref 55–135)
ALT SERPL W/O P-5'-P-CCNC: 21 U/L (ref 10–44)
ANION GAP SERPL CALC-SCNC: 9 MMOL/L (ref 8–16)
AST SERPL-CCNC: 25 U/L (ref 10–40)
BASOPHILS # BLD AUTO: 0.04 K/UL (ref 0–0.2)
BASOPHILS NFR BLD: 0.5 % (ref 0–1.9)
BILIRUB SERPL-MCNC: 0.6 MG/DL (ref 0.1–1)
BUN SERPL-MCNC: 14 MG/DL (ref 8–23)
CALCIUM SERPL-MCNC: 9.1 MG/DL (ref 8.7–10.5)
CHLORIDE SERPL-SCNC: 108 MMOL/L (ref 95–110)
CO2 SERPL-SCNC: 23 MMOL/L (ref 23–29)
CREAT SERPL-MCNC: 1.3 MG/DL (ref 0.5–1.4)
DIFFERENTIAL METHOD: ABNORMAL
EOSINOPHIL # BLD AUTO: 0.2 K/UL (ref 0–0.5)
EOSINOPHIL NFR BLD: 2.4 % (ref 0–8)
ERYTHROCYTE [DISTWIDTH] IN BLOOD BY AUTOMATED COUNT: 12.3 % (ref 11.5–14.5)
EST. GFR  (AFRICAN AMERICAN): >60 ML/MIN/1.73 M^2
EST. GFR  (NON AFRICAN AMERICAN): 53 ML/MIN/1.73 M^2
GLUCOSE SERPL-MCNC: 77 MG/DL (ref 70–110)
HCT VFR BLD AUTO: 42 % (ref 40–54)
HGB BLD-MCNC: 13.7 G/DL (ref 14–18)
IMM GRANULOCYTES # BLD AUTO: 0.03 K/UL (ref 0–0.04)
IMM GRANULOCYTES NFR BLD AUTO: 0.4 % (ref 0–0.5)
LYMPHOCYTES # BLD AUTO: 3.8 K/UL (ref 1–4.8)
LYMPHOCYTES NFR BLD: 51.4 % (ref 18–48)
MCH RBC QN AUTO: 28.8 PG (ref 27–31)
MCHC RBC AUTO-ENTMCNC: 32.6 G/DL (ref 32–36)
MCV RBC AUTO: 88 FL (ref 82–98)
MONOCYTES # BLD AUTO: 0.8 K/UL (ref 0.3–1)
MONOCYTES NFR BLD: 10.8 % (ref 4–15)
NEUTROPHILS # BLD AUTO: 2.5 K/UL (ref 1.8–7.7)
NEUTROPHILS NFR BLD: 34.5 % (ref 38–73)
NRBC BLD-RTO: 0 /100 WBC
PLATELET # BLD AUTO: 192 K/UL (ref 150–350)
PMV BLD AUTO: 10.6 FL (ref 9.2–12.9)
POTASSIUM SERPL-SCNC: 3.6 MMOL/L (ref 3.5–5.1)
PROT SERPL-MCNC: 7.3 G/DL (ref 6–8.4)
RBC # BLD AUTO: 4.75 M/UL (ref 4.6–6.2)
SODIUM SERPL-SCNC: 140 MMOL/L (ref 136–145)
WBC # BLD AUTO: 7.39 K/UL (ref 3.9–12.7)

## 2020-05-12 PROCEDURE — 36415 COLL VENOUS BLD VENIPUNCTURE: CPT

## 2020-05-12 PROCEDURE — 80177 DRUG SCRN QUAN LEVETIRACETAM: CPT

## 2020-05-12 PROCEDURE — 80053 COMPREHEN METABOLIC PANEL: CPT

## 2020-05-12 PROCEDURE — 85025 COMPLETE CBC W/AUTO DIFF WBC: CPT

## 2020-05-15 LAB — LEVETIRACETAM SERPL-MCNC: 9.5 UG/ML (ref 3–60)

## 2020-08-21 ENCOUNTER — LAB VISIT (OUTPATIENT)
Dept: LAB | Facility: HOSPITAL | Age: 75
End: 2020-08-21
Attending: INTERNAL MEDICINE
Payer: MEDICARE

## 2020-08-21 DIAGNOSIS — N19 RENAL FAILURE SYNDROME: ICD-10-CM

## 2020-08-21 DIAGNOSIS — D64.9 ANEMIA, UNSPECIFIED: Primary | ICD-10-CM

## 2020-08-21 DIAGNOSIS — I10 ESSENTIAL HYPERTENSION, MALIGNANT: ICD-10-CM

## 2020-08-21 LAB
ANION GAP SERPL CALC-SCNC: 12 MMOL/L (ref 8–16)
BUN SERPL-MCNC: 13 MG/DL (ref 8–23)
CALCIUM SERPL-MCNC: 9.1 MG/DL (ref 8.7–10.5)
CHLORIDE SERPL-SCNC: 105 MMOL/L (ref 95–110)
CO2 SERPL-SCNC: 24 MMOL/L (ref 23–29)
CREAT SERPL-MCNC: 1.2 MG/DL (ref 0.5–1.4)
ERYTHROCYTE [DISTWIDTH] IN BLOOD BY AUTOMATED COUNT: 12.1 % (ref 11.5–14.5)
EST. GFR  (AFRICAN AMERICAN): >60 ML/MIN/1.73 M^2
EST. GFR  (NON AFRICAN AMERICAN): 58.4 ML/MIN/1.73 M^2
GLUCOSE SERPL-MCNC: 77 MG/DL (ref 70–110)
HCT VFR BLD AUTO: 41.6 % (ref 40–54)
HGB BLD-MCNC: 13.7 G/DL (ref 14–18)
MCH RBC QN AUTO: 29 PG (ref 27–31)
MCHC RBC AUTO-ENTMCNC: 32.9 G/DL (ref 32–36)
MCV RBC AUTO: 88 FL (ref 82–98)
PLATELET # BLD AUTO: 200 K/UL (ref 150–350)
PMV BLD AUTO: 10.6 FL (ref 9.2–12.9)
POTASSIUM SERPL-SCNC: 3.7 MMOL/L (ref 3.5–5.1)
RBC # BLD AUTO: 4.73 M/UL (ref 4.6–6.2)
SODIUM SERPL-SCNC: 141 MMOL/L (ref 136–145)
WBC # BLD AUTO: 6.23 K/UL (ref 3.9–12.7)

## 2020-08-21 PROCEDURE — 36415 COLL VENOUS BLD VENIPUNCTURE: CPT

## 2020-08-21 PROCEDURE — 80048 BASIC METABOLIC PNL TOTAL CA: CPT

## 2020-08-21 PROCEDURE — 80177 DRUG SCRN QUAN LEVETIRACETAM: CPT

## 2020-08-21 PROCEDURE — 85027 COMPLETE CBC AUTOMATED: CPT

## 2020-08-24 LAB — LEVETIRACETAM SERPL-MCNC: 10.7 UG/ML (ref 3–60)

## 2020-11-12 ENCOUNTER — LAB VISIT (OUTPATIENT)
Dept: LAB | Facility: HOSPITAL | Age: 75
End: 2020-11-12
Attending: NURSE PRACTITIONER
Payer: MEDICARE

## 2020-11-12 DIAGNOSIS — R50.9 FEVER: Primary | ICD-10-CM

## 2020-11-12 DIAGNOSIS — R53.81 MALAISE: ICD-10-CM

## 2020-11-12 LAB
ALBUMIN SERPL BCP-MCNC: 3.6 G/DL (ref 3.5–5.2)
ALP SERPL-CCNC: 78 U/L (ref 55–135)
ALT SERPL W/O P-5'-P-CCNC: 21 U/L (ref 10–44)
ANION GAP SERPL CALC-SCNC: 11 MMOL/L (ref 8–16)
AST SERPL-CCNC: 27 U/L (ref 10–40)
BILIRUB SERPL-MCNC: 1.4 MG/DL (ref 0.1–1)
BUN SERPL-MCNC: 19 MG/DL (ref 8–23)
CALCIUM SERPL-MCNC: 9 MG/DL (ref 8.7–10.5)
CHLORIDE SERPL-SCNC: 108 MMOL/L (ref 95–110)
CO2 SERPL-SCNC: 24 MMOL/L (ref 23–29)
CREAT SERPL-MCNC: 1.4 MG/DL (ref 0.5–1.4)
EST. GFR  (AFRICAN AMERICAN): 56 ML/MIN/1.73 M^2
EST. GFR  (NON AFRICAN AMERICAN): 48.5 ML/MIN/1.73 M^2
GLUCOSE SERPL-MCNC: 105 MG/DL (ref 70–110)
INFLUENZA A, MOLECULAR: NEGATIVE
INFLUENZA B, MOLECULAR: NEGATIVE
POTASSIUM SERPL-SCNC: 3.6 MMOL/L (ref 3.5–5.1)
PROT SERPL-MCNC: 7.8 G/DL (ref 6–8.4)
SODIUM SERPL-SCNC: 143 MMOL/L (ref 136–145)
SPECIMEN SOURCE: NORMAL

## 2020-11-12 PROCEDURE — 36415 COLL VENOUS BLD VENIPUNCTURE: CPT

## 2020-11-12 PROCEDURE — 80053 COMPREHEN METABOLIC PANEL: CPT

## 2020-11-12 PROCEDURE — 87502 INFLUENZA DNA AMP PROBE: CPT

## 2020-11-13 ENCOUNTER — APPOINTMENT (OUTPATIENT)
Dept: LAB | Facility: HOSPITAL | Age: 75
End: 2020-11-13
Attending: NURSE PRACTITIONER
Payer: MEDICARE

## 2020-11-13 DIAGNOSIS — N39.0 URINARY TRACT INFECTION, SITE NOT SPECIFIED: Primary | ICD-10-CM

## 2020-11-13 LAB
BACTERIA #/AREA URNS HPF: ABNORMAL /HPF
BILIRUB UR QL STRIP: ABNORMAL
CLARITY UR: ABNORMAL
COLOR UR: ABNORMAL
GLUCOSE UR QL STRIP: NEGATIVE
HGB UR QL STRIP: ABNORMAL
HYALINE CASTS #/AREA URNS LPF: 0 /LPF
KETONES UR QL STRIP: NEGATIVE
LEUKOCYTE ESTERASE UR QL STRIP: ABNORMAL
MICROSCOPIC COMMENT: ABNORMAL
NITRITE UR QL STRIP: POSITIVE
PH UR STRIP: 6 [PH] (ref 5–8)
PROT UR QL STRIP: ABNORMAL
RBC #/AREA URNS HPF: 50 /HPF (ref 0–4)
SP GR UR STRIP: >=1.03 (ref 1–1.03)
URN SPEC COLLECT METH UR: ABNORMAL
UROBILINOGEN UR STRIP-ACNC: 1 EU/DL
WBC #/AREA URNS HPF: >100 /HPF (ref 0–5)

## 2020-11-13 PROCEDURE — 81000 URINALYSIS NONAUTO W/SCOPE: CPT

## 2020-11-13 PROCEDURE — 87088 URINE BACTERIA CULTURE: CPT

## 2020-11-13 PROCEDURE — 87086 URINE CULTURE/COLONY COUNT: CPT

## 2020-11-13 PROCEDURE — 87077 CULTURE AEROBIC IDENTIFY: CPT

## 2020-11-13 PROCEDURE — 87186 SC STD MICRODIL/AGAR DIL: CPT

## 2020-11-16 LAB — BACTERIA UR CULT: ABNORMAL

## 2021-02-12 ENCOUNTER — APPOINTMENT (OUTPATIENT)
Dept: LAB | Facility: HOSPITAL | Age: 76
End: 2021-02-12
Attending: INTERNAL MEDICINE
Payer: MEDICARE

## 2021-02-12 DIAGNOSIS — R35.0 FREQUENCY OF MICTURITION: Primary | ICD-10-CM

## 2021-02-12 DIAGNOSIS — R32 INCONTINENCE: ICD-10-CM

## 2021-02-12 LAB
BACTERIA #/AREA URNS HPF: ABNORMAL /HPF
BILIRUB UR QL STRIP: NEGATIVE
CLARITY UR: CLEAR
COLOR UR: YELLOW
GLUCOSE UR QL STRIP: NEGATIVE
HGB UR QL STRIP: ABNORMAL
HYALINE CASTS #/AREA URNS LPF: 0 /LPF
KETONES UR QL STRIP: NEGATIVE
LEUKOCYTE ESTERASE UR QL STRIP: NEGATIVE
MICROSCOPIC COMMENT: ABNORMAL
NITRITE UR QL STRIP: NEGATIVE
NON-SQ EPI CELLS #/AREA URNS HPF: 3 /HPF
PH UR STRIP: 7 [PH] (ref 5–8)
PROT UR QL STRIP: ABNORMAL
RBC #/AREA URNS HPF: >100 /HPF (ref 0–4)
SP GR UR STRIP: 1.02 (ref 1–1.03)
URN SPEC COLLECT METH UR: ABNORMAL
UROBILINOGEN UR STRIP-ACNC: NEGATIVE EU/DL
WBC #/AREA URNS HPF: 2 /HPF (ref 0–5)

## 2021-02-12 PROCEDURE — 81000 URINALYSIS NONAUTO W/SCOPE: CPT

## 2021-02-12 PROCEDURE — 87086 URINE CULTURE/COLONY COUNT: CPT

## 2021-02-15 LAB — BACTERIA UR CULT: NO GROWTH

## 2023-11-22 ENCOUNTER — APPOINTMENT (OUTPATIENT)
Dept: LAB | Facility: HOSPITAL | Age: 78
End: 2023-11-22
Attending: NURSE PRACTITIONER
Payer: MEDICARE

## 2023-11-22 DIAGNOSIS — R32 URINARY INCONTINENCE: ICD-10-CM

## 2023-11-22 DIAGNOSIS — R53.1 WEAKNESS: ICD-10-CM

## 2023-11-22 DIAGNOSIS — R53.81 MALAISE: Primary | ICD-10-CM

## 2023-11-22 LAB
BACTERIA #/AREA URNS HPF: ABNORMAL /HPF
BILIRUB UR QL STRIP: NEGATIVE
CAOX CRY URNS QL MICRO: ABNORMAL
CLARITY UR: ABNORMAL
COLOR UR: ABNORMAL
GLUCOSE UR QL STRIP: NEGATIVE
HGB UR QL STRIP: ABNORMAL
HYALINE CASTS #/AREA URNS LPF: 0 /LPF
KETONES UR QL STRIP: NEGATIVE
LEUKOCYTE ESTERASE UR QL STRIP: NEGATIVE
MICROSCOPIC COMMENT: ABNORMAL
NITRITE UR QL STRIP: POSITIVE
PH UR STRIP: 6 [PH] (ref 5–8)
PROT UR QL STRIP: ABNORMAL
RBC #/AREA URNS HPF: 20 /HPF (ref 0–4)
SP GR UR STRIP: >=1.03 (ref 1–1.03)
SQUAMOUS #/AREA URNS HPF: 5 /HPF
URN SPEC COLLECT METH UR: ABNORMAL
UROBILINOGEN UR STRIP-ACNC: NEGATIVE EU/DL
WBC #/AREA URNS HPF: 45 /HPF (ref 0–5)

## 2023-11-22 PROCEDURE — 87086 URINE CULTURE/COLONY COUNT: CPT | Performed by: NURSE PRACTITIONER

## 2023-11-22 PROCEDURE — 87186 SC STD MICRODIL/AGAR DIL: CPT | Performed by: NURSE PRACTITIONER

## 2023-11-22 PROCEDURE — 87088 URINE BACTERIA CULTURE: CPT | Performed by: NURSE PRACTITIONER

## 2023-11-22 PROCEDURE — 87077 CULTURE AEROBIC IDENTIFY: CPT | Performed by: NURSE PRACTITIONER

## 2023-11-22 PROCEDURE — 81000 URINALYSIS NONAUTO W/SCOPE: CPT | Performed by: NURSE PRACTITIONER

## 2023-11-25 LAB — BACTERIA UR CULT: ABNORMAL

## 2024-02-26 ENCOUNTER — HOSPITAL ENCOUNTER (EMERGENCY)
Facility: HOSPITAL | Age: 79
Discharge: HOME | End: 2024-02-27
Attending: EMERGENCY MEDICINE
Payer: MEDICARE

## 2024-02-26 DIAGNOSIS — A41.9 SEPSIS, DUE TO UNSPECIFIED ORGANISM, UNSPECIFIED WHETHER ACUTE ORGAN DYSFUNCTION PRESENT: ICD-10-CM

## 2024-02-26 DIAGNOSIS — R56.9 SEIZURE: Primary | ICD-10-CM

## 2024-02-26 LAB
ALBUMIN SERPL BCP-MCNC: 3.7 G/DL (ref 3.5–5.2)
ALP SERPL-CCNC: 113 U/L (ref 55–135)
ALT SERPL W/O P-5'-P-CCNC: 11 U/L (ref 10–44)
ANION GAP SERPL CALC-SCNC: 22 MMOL/L (ref 8–16)
AST SERPL-CCNC: 26 U/L (ref 10–40)
BASOPHILS # BLD AUTO: ABNORMAL K/UL (ref 0–0.2)
BASOPHILS NFR BLD: 0 % (ref 0–1.9)
BILIRUB SERPL-MCNC: 0.3 MG/DL (ref 0.1–1)
BUN SERPL-MCNC: 15 MG/DL (ref 8–23)
CALCIUM SERPL-MCNC: 10 MG/DL (ref 8.7–10.5)
CHLORIDE SERPL-SCNC: 106 MMOL/L (ref 95–110)
CO2 SERPL-SCNC: 20 MMOL/L (ref 23–29)
CREAT SERPL-MCNC: 2.1 MG/DL (ref 0.5–1.4)
DIFFERENTIAL METHOD BLD: ABNORMAL
EOSINOPHIL # BLD AUTO: ABNORMAL K/UL (ref 0–0.5)
EOSINOPHIL NFR BLD: 0 % (ref 0–8)
ERYTHROCYTE [DISTWIDTH] IN BLOOD BY AUTOMATED COUNT: 12.1 % (ref 11.5–14.5)
EST. GFR  (NO RACE VARIABLE): 31.2 ML/MIN/1.73 M^2
GLUCOSE SERPL-MCNC: 92 MG/DL (ref 70–110)
HCT VFR BLD AUTO: 46.5 % (ref 40–54)
HGB BLD-MCNC: 14.6 G/DL (ref 14–18)
IMM GRANULOCYTES # BLD AUTO: ABNORMAL K/UL (ref 0–0.04)
IMM GRANULOCYTES NFR BLD AUTO: ABNORMAL % (ref 0–0.5)
LACTATE SERPL-SCNC: >12 MMOL/L (ref 0.5–2.2)
LYMPHOCYTES # BLD AUTO: ABNORMAL K/UL (ref 1–4.8)
LYMPHOCYTES NFR BLD: 29 % (ref 18–48)
MAGNESIUM SERPL-MCNC: 2.8 MG/DL (ref 1.6–2.6)
MCH RBC QN AUTO: 28.9 PG (ref 27–31)
MCHC RBC AUTO-ENTMCNC: 31.4 G/DL (ref 32–36)
MCV RBC AUTO: 92 FL (ref 82–98)
MONOCYTES # BLD AUTO: ABNORMAL K/UL (ref 0.3–1)
MONOCYTES NFR BLD: 1 % (ref 4–15)
MYELOCYTES NFR BLD MANUAL: 2 %
NEUTROPHILS NFR BLD: 68 % (ref 38–73)
NRBC BLD-RTO: 0 /100 WBC
PLATELET # BLD AUTO: 323 K/UL (ref 150–450)
PMV BLD AUTO: 10.4 FL (ref 9.2–12.9)
POTASSIUM SERPL-SCNC: 4.2 MMOL/L (ref 3.5–5.1)
PROT SERPL-MCNC: 8.4 G/DL (ref 6–8.4)
RBC # BLD AUTO: 5.05 M/UL (ref 4.6–6.2)
SODIUM SERPL-SCNC: 148 MMOL/L (ref 136–145)
WBC # BLD AUTO: 31.57 K/UL (ref 3.9–12.7)

## 2024-02-26 PROCEDURE — 63600175 PHARM REV CODE 636 W HCPCS: Performed by: EMERGENCY MEDICINE

## 2024-02-26 PROCEDURE — 83605 ASSAY OF LACTIC ACID: CPT | Performed by: EMERGENCY MEDICINE

## 2024-02-26 PROCEDURE — 99284 EMERGENCY DEPT VISIT MOD MDM: CPT | Mod: 25

## 2024-02-26 PROCEDURE — 86803 HEPATITIS C AB TEST: CPT | Performed by: EMERGENCY MEDICINE

## 2024-02-26 PROCEDURE — 87502 INFLUENZA DNA AMP PROBE: CPT | Performed by: EMERGENCY MEDICINE

## 2024-02-26 PROCEDURE — 85060 BLOOD SMEAR INTERPRETATION: CPT | Mod: ,,, | Performed by: PATHOLOGY

## 2024-02-26 PROCEDURE — 85007 BL SMEAR W/DIFF WBC COUNT: CPT | Performed by: EMERGENCY MEDICINE

## 2024-02-26 PROCEDURE — 87040 BLOOD CULTURE FOR BACTERIA: CPT | Performed by: EMERGENCY MEDICINE

## 2024-02-26 PROCEDURE — 71045 X-RAY EXAM CHEST 1 VIEW: CPT | Mod: 26,,, | Performed by: RADIOLOGY

## 2024-02-26 PROCEDURE — 71045 X-RAY EXAM CHEST 1 VIEW: CPT | Mod: TC

## 2024-02-26 PROCEDURE — 87154 CUL TYP ID BLD PTHGN 6+ TRGT: CPT | Mod: 59 | Performed by: EMERGENCY MEDICINE

## 2024-02-26 PROCEDURE — 96361 HYDRATE IV INFUSION ADD-ON: CPT

## 2024-02-26 PROCEDURE — 96375 TX/PRO/DX INJ NEW DRUG ADDON: CPT

## 2024-02-26 PROCEDURE — U0002 COVID-19 LAB TEST NON-CDC: HCPCS | Performed by: EMERGENCY MEDICINE

## 2024-02-26 PROCEDURE — 85027 COMPLETE CBC AUTOMATED: CPT | Performed by: EMERGENCY MEDICINE

## 2024-02-26 PROCEDURE — 63600175 PHARM REV CODE 636 W HCPCS: Mod: UD

## 2024-02-26 PROCEDURE — 36415 COLL VENOUS BLD VENIPUNCTURE: CPT | Performed by: EMERGENCY MEDICINE

## 2024-02-26 PROCEDURE — 83735 ASSAY OF MAGNESIUM: CPT | Performed by: EMERGENCY MEDICINE

## 2024-02-26 PROCEDURE — 80053 COMPREHEN METABOLIC PANEL: CPT | Performed by: EMERGENCY MEDICINE

## 2024-02-26 PROCEDURE — 25000003 PHARM REV CODE 250: Mod: UD | Performed by: EMERGENCY MEDICINE

## 2024-02-26 PROCEDURE — 96365 THER/PROPH/DIAG IV INF INIT: CPT

## 2024-02-26 PROCEDURE — 87389 HIV-1 AG W/HIV-1&-2 AB AG IA: CPT | Performed by: EMERGENCY MEDICINE

## 2024-02-26 RX ORDER — LORAZEPAM 2 MG/ML
2 INJECTION INTRAMUSCULAR
Status: COMPLETED | OUTPATIENT
Start: 2024-02-26 | End: 2024-02-26

## 2024-02-26 RX ORDER — LORAZEPAM 2 MG/ML
INJECTION INTRAMUSCULAR
Status: DISCONTINUED
Start: 2024-02-26 | End: 2024-02-27 | Stop reason: HOSPADM

## 2024-02-26 RX ORDER — LEVETIRACETAM 500 MG/5ML
2000 INJECTION, SOLUTION, CONCENTRATE INTRAVENOUS
Status: COMPLETED | OUTPATIENT
Start: 2024-02-26 | End: 2024-02-26

## 2024-02-26 RX ORDER — LORAZEPAM 2 MG/ML
INJECTION INTRAMUSCULAR
Status: COMPLETED
Start: 2024-02-26 | End: 2024-02-26

## 2024-02-26 RX ADMIN — LORAZEPAM 2 MG: 2 INJECTION INTRAMUSCULAR at 10:02

## 2024-02-26 RX ADMIN — CEFTRIAXONE 2 G: 2 INJECTION, POWDER, FOR SOLUTION INTRAMUSCULAR; INTRAVENOUS at 11:02

## 2024-02-26 RX ADMIN — SODIUM CHLORIDE 1000 ML: 9 INJECTION, SOLUTION INTRAVENOUS at 11:02

## 2024-02-26 RX ADMIN — LORAZEPAM 2 MG: 2 INJECTION INTRAMUSCULAR; INTRAVENOUS at 10:02

## 2024-02-26 RX ADMIN — LEVETIRACETAM 2000 MG: 100 INJECTION, SOLUTION INTRAVENOUS at 11:02

## 2024-02-27 VITALS
HEART RATE: 91 BPM | SYSTOLIC BLOOD PRESSURE: 158 MMHG | OXYGEN SATURATION: 91 % | RESPIRATION RATE: 35 BRPM | DIASTOLIC BLOOD PRESSURE: 96 MMHG | TEMPERATURE: 99 F

## 2024-02-27 LAB
HCV AB SERPL QL IA: NORMAL
HIV 1+2 AB+HIV1 P24 AG SERPL QL IA: NORMAL
INFLUENZA A, MOLECULAR: NEGATIVE
INFLUENZA B, MOLECULAR: NEGATIVE
PATH REV BLD -IMP: NORMAL
SARS-COV-2 RDRP RESP QL NAA+PROBE: NEGATIVE
SPECIMEN SOURCE: NORMAL

## 2024-02-27 PROCEDURE — 27000221 HC OXYGEN, UP TO 24 HOURS

## 2024-02-27 PROCEDURE — 94760 N-INVAS EAR/PLS OXIMETRY 1: CPT

## 2024-02-27 NOTE — ED PROVIDER NOTES
Encounter Date: 2/26/2024       History     Chief Complaint   Patient presents with    Seizures     78-year-old male Sent from nursing home, on hospice, has known seizure disorder apparently nursing home could not get seizure to stop.  Nothing given for the seizure.  EMS gave Ativan intranasally with no change.    The history is provided by the patient. No  was used.     Review of patient's allergies indicates:   Allergen Reactions    Ace inhibitors     Bactrim [sulfamethoxazole-trimethoprim] Rash     Past Medical History:   Diagnosis Date    CVA (cerebral infarction)     Hypertension     Non-compliance     Seizure disorder      No past surgical history on file.  No family history on file.  Social History     Tobacco Use    Smoking status: Every Day     Current packs/day: 0.50     Types: Cigarettes   Substance Use Topics    Alcohol use: Yes     Alcohol/week: 4.2 standard drinks of alcohol     Types: 5 drink(s) per week    Drug use: No     Review of Systems   Unable to perform ROS: Patient unresponsive   Neurological:  Positive for seizures.       Physical Exam     Initial Vitals   BP Pulse Resp Temp SpO2   02/26/24 2256 02/26/24 2252 02/26/24 2256 02/26/24 2256 02/26/24 2252   (!) 161/59 (!) 120 (!) 24 (!) 102.7 °F (39.3 °C) 95 %      MAP       --                Physical Exam    Nursing note and vitals reviewed.  HENT:   Head: Normocephalic and atraumatic.   Neck: No thyromegaly present. No tracheal deviation present.   Cardiovascular:  Normal rate and regular rhythm.           Pulmonary/Chest: Breath sounds normal.   Abdominal: Abdomen is soft.     Skin: Skin is warm. No rash noted. No erythema.         ED Course   Procedures  Labs Reviewed   CBC W/ AUTO DIFFERENTIAL - Abnormal; Notable for the following components:       Result Value    WBC 31.57 (*)     MCHC 31.4 (*)     Mono % 1.0 (*)     All other components within normal limits   COMPREHENSIVE METABOLIC PANEL - Abnormal; Notable for the  following components:    Sodium 148 (*)     CO2 20 (*)     Creatinine 2.1 (*)     eGFR 31.2 (*)     Anion Gap 22 (*)     All other components within normal limits   LACTIC ACID, PLASMA - Abnormal; Notable for the following components:    Lactate (Lactic Acid) >12.0 (*)     All other components within normal limits    Narrative:     Lactic Acid critical result(s) called and verbal readback obtained   from Aranza Davis RN.  by TH3 02/26/2024 23:28   MAGNESIUM - Abnormal; Notable for the following components:    Magnesium 2.8 (*)     All other components within normal limits   CULTURE, BLOOD   CULTURE, BLOOD   INFLUENZA A & B BY MOLECULAR   HIV 1 / 2 ANTIBODY    Narrative:     Release to patient->Immediate   HEPATITIS C ANTIBODY    Narrative:     Release to patient->Immediate   SARS-COV-2 RNA AMPLIFICATION, QUAL    Narrative:     Is the patient symptomatic?->Yes   PATHOLOGIST INTERPRETATION DIFFERENTIAL          Imaging Results              X-Ray Chest AP Portable (Final result)  Result time 02/26/24 23:46:33      Final result by Bebeto Hills MD (02/26/24 23:46:33)                   Impression:      Mild chronic coarse interstitial attenuation.  No convincing radiographic evidence of acute intrathoracic process on this single view.      Electronically signed by: Bebeto Hills MD  Date:    02/26/2024  Time:    23:46               Narrative:    EXAMINATION:  XR CHEST AP PORTABLE    CLINICAL HISTORY:  fever;    TECHNIQUE:  Single frontal view of the chest was performed.    COMPARISON:  11/14/2019    FINDINGS:  The patient is rotated limiting assessment.  Cardiac monitoring leads overlie the chest.  Cardiac silhouette is not significantly enlarged.  There is atherosclerotic calcification of the thoracic aorta.  Lungs demonstrate mild chronic coarse interstitial attenuation without evidence of new large confluent airspace consolidation.  No significant volume of pleural fluid or pneumothorax appreciated.  Osseous  structures demonstrate degenerative changes.  There is nonspecific gaseous distention of the bowel within the visualized upper abdomen.                                       Medications   LORazepam injection 2 mg (2 mg Intravenous Given 2/26/24 2249)   LORazepam injection 2 mg (2 mg Intravenous Given 2/26/24 2258)   levETIRAcetam injection 2,000 mg (2,000 mg Intravenous Given 2/26/24 2320)   sodium chloride 0.9% bolus 1,000 mL 1,000 mL (0 mLs Intravenous Stopped 2/27/24 0010)   cefTRIAXone (ROCEPHIN) 2 g in dextrose 5 % in water (D5W) 100 mL IVPB (MB+) (0 g Intravenous Stopped 2/26/24 2355)   sodium chloride 0.9% bolus 1,000 mL 1,000 mL (0 mLs Intravenous Stopped 2/27/24 0042)     Medical Decision Making  IV establishment on patient arrival.  Patient given 2 mg of Ativan twice with breaking of his seizure.  Loaded with 2 g of Keppra.  Called patient's daughter who was informed that the patient is febrile and was transferred here for seizures.  She is adamant that she does not want patient's hospice status to be changed.  She would like him to just be kept comfortable.  She is fine with him going back to the nursing home with the hospice nurse updating orders to continue to keep the patient comfortable.    Amount and/or Complexity of Data Reviewed  Labs: ordered. Decision-making details documented in ED Course.  Radiology: ordered.    Risk  Prescription drug management.                                      Clinical Impression:  Final diagnoses:  [R56.9] Seizure (Primary)  [A41.9] Sepsis, due to unspecified organism, unspecified whether acute organ dysfunction present          ED Disposition Condition    Discharge Stable          ED Prescriptions    None       Follow-up Information    None          Gretta Fairbanks MD  02/28/24 9968

## 2024-02-27 NOTE — ED TRIAGE NOTES
Pt arrived from [facility]. Reports that pt has been having a seizure for approx 45 mins-1 hour. Received ativan intranasally with no results. Pt actively seizing upon arrival to ER

## 2024-02-27 NOTE — ED NOTES
"Spoke with patient's daughter, Lizett [phone]; States "I just want him to be comfortable"; Daughter okay with plan of care to return patient to [facility] and have [facility] see patient tomorrow; Daughter informed of patient's lab work;     Called and updated [facility] and informed of patient returning to [facility]; States she will fax orders to [facility] for hospice care  "

## 2024-02-27 NOTE — DISCHARGE INSTRUCTIONS
Patient's seizure has been broken.  He may return to hospice status at the nursing home.  Hospice nurse will meet him at the nursing home with new orders regarding she is her treatment and comfort care measures.

## 2024-02-28 ENCOUNTER — TELEPHONE (OUTPATIENT)
Dept: EMERGENCY MEDICINE | Facility: HOSPITAL | Age: 79
End: 2024-02-28
Payer: MEDICARE

## 2024-02-28 LAB
ACINETOBACTER CALCOACETICUS/BAUMANNII COMPLEX: NOT DETECTED
BACTEROIDES FRAGILIS: NOT DETECTED
CANDIDA ALBICANS: NOT DETECTED
CANDIDA AURIS: NOT DETECTED
CANDIDA GLABRATA: NOT DETECTED
CANDIDA KRUSEI: NOT DETECTED
CANDIDA PARAPSILOSIS: NOT DETECTED
CANDIDA TROPICALIS: NOT DETECTED
CRYPTOCOCCUS NEOFORMANS/GATTII: NOT DETECTED
CTX-M GENE (ESBL PRODUCER): ABNORMAL
ENTEROBACTER CLOACAE COMPLEX: NOT DETECTED
ENTEROBACTERALES: NOT DETECTED
ENTEROCOCCUS FAECALIS: NOT DETECTED
ENTEROCOCCUS FAECIUM: NOT DETECTED
ESCHERICHIA COLI: NOT DETECTED
HAEMOPHILUS INFLUENZAE: NOT DETECTED
IMP GENE (CARBAPENEM RESISTANT): ABNORMAL
KLEBSIELLA AEROGENES: NOT DETECTED
KLEBSIELLA OXYTOCA: NOT DETECTED
KLEBSIELLA PNEUMONIAE GROUP: NOT DETECTED
KPC RESISTANCE GENE (CARBAPENEM): ABNORMAL
LISTERIA MONOCYTOGENES: NOT DETECTED
MCR-1: ABNORMAL
MEC A/C AND MREJ (MRSA): ABNORMAL
MEC A/C: ABNORMAL
NDM GENE (CARBAPENEM RESISTANT): ABNORMAL
NEISSERIA MENINGITIDIS: NOT DETECTED
OXA-48-LIKE (CARBAPENEM RESISTANT): ABNORMAL
PATH REV BLD -IMP: NORMAL
PROTEUS SPECIES: NOT DETECTED
PSEUDOMONAS AERUGINOSA: NOT DETECTED
SALMONELLA SP: NOT DETECTED
SERRATIA MARCESCENS: NOT DETECTED
STAPHYLOCOCCUS AUREUS: NOT DETECTED
STAPHYLOCOCCUS EPIDERMIDIS: NOT DETECTED
STAPHYLOCOCCUS LUGDUNESIS: NOT DETECTED
STAPHYLOCOCCUS SPECIES: DETECTED
STENOTROPHOMONAS MALTOPHILIA: NOT DETECTED
STREPTOCOCCUS AGALACTIAE: NOT DETECTED
STREPTOCOCCUS PNEUMONIAE: NOT DETECTED
STREPTOCOCCUS PYOGENES: NOT DETECTED
STREPTOCOCCUS SPECIES: NOT DETECTED
VAN A/B (VRE GENE): ABNORMAL
VIM GENE (CARBAPENEM RESISTANT): ABNORMAL

## 2024-03-01 LAB
BACTERIA BLD CULT: ABNORMAL

## 2024-03-03 LAB — BACTERIA BLD CULT: NORMAL
